# Patient Record
Sex: MALE | Race: WHITE | Employment: OTHER
[De-identification: names, ages, dates, MRNs, and addresses within clinical notes are randomized per-mention and may not be internally consistent; named-entity substitution may affect disease eponyms.]

---

## 2023-06-03 ENCOUNTER — HOSPITAL ENCOUNTER (EMERGENCY)
Facility: HOSPITAL | Age: 73
Discharge: HOME | End: 2023-06-03
Attending: STUDENT IN AN ORGANIZED HEALTH CARE EDUCATION/TRAINING PROGRAM
Payer: MEDICARE

## 2023-06-03 VITALS
HEIGHT: 74 IN | OXYGEN SATURATION: 97 % | SYSTOLIC BLOOD PRESSURE: 134 MMHG | DIASTOLIC BLOOD PRESSURE: 80 MMHG | BODY MASS INDEX: 17.32 KG/M2 | HEART RATE: 64 BPM | TEMPERATURE: 97.5 F | RESPIRATION RATE: 16 BRPM | WEIGHT: 135 LBS

## 2023-06-03 DIAGNOSIS — L02.91 ABSCESS: Primary | ICD-10-CM

## 2023-06-03 PROCEDURE — 2500000003 HC RX 250 WO HCPCS: Performed by: STUDENT IN AN ORGANIZED HEALTH CARE EDUCATION/TRAINING PROGRAM

## 2023-06-03 PROCEDURE — 10060 I&D ABSCESS SIMPLE/SINGLE: CPT

## 2023-06-03 PROCEDURE — 99283 EMERGENCY DEPT VISIT LOW MDM: CPT

## 2023-06-03 PROCEDURE — 6370000000 HC RX 637 (ALT 250 FOR IP): Performed by: STUDENT IN AN ORGANIZED HEALTH CARE EDUCATION/TRAINING PROGRAM

## 2023-06-03 RX ORDER — DOXYCYCLINE HYCLATE 100 MG
100 TABLET ORAL
Status: COMPLETED | OUTPATIENT
Start: 2023-06-03 | End: 2023-06-03

## 2023-06-03 RX ORDER — ESCITALOPRAM OXALATE 10 MG/1
10 TABLET ORAL DAILY
COMMUNITY

## 2023-06-03 RX ORDER — DOXYCYCLINE HYCLATE 100 MG
100 TABLET ORAL 2 TIMES DAILY
Qty: 14 TABLET | Refills: 0 | Status: SHIPPED | OUTPATIENT
Start: 2023-06-03 | End: 2023-06-03 | Stop reason: SDUPTHER

## 2023-06-03 RX ORDER — DOXYCYCLINE HYCLATE 100 MG
100 TABLET ORAL 2 TIMES DAILY
Qty: 14 TABLET | Refills: 0 | Status: SHIPPED | OUTPATIENT
Start: 2023-06-03 | End: 2023-06-10

## 2023-06-03 RX ORDER — MEMANTINE HYDROCHLORIDE 5 MG/1
5 TABLET ORAL 2 TIMES DAILY
COMMUNITY

## 2023-06-03 RX ORDER — LIDOCAINE HYDROCHLORIDE AND EPINEPHRINE 10; 10 MG/ML; UG/ML
10 INJECTION, SOLUTION INFILTRATION; PERINEURAL
Status: COMPLETED | OUTPATIENT
Start: 2023-06-03 | End: 2023-06-03

## 2023-06-03 RX ADMIN — LIDOCAINE HYDROCHLORIDE,EPINEPHRINE BITARTRATE 10 ML: 10; .01 INJECTION, SOLUTION INFILTRATION; PERINEURAL at 18:22

## 2023-06-03 RX ADMIN — DOXYCYCLINE HYCLATE 100 MG: 100 TABLET, COATED ORAL at 19:08

## 2023-06-03 ASSESSMENT — PAIN SCALES - GENERAL: PAINLEVEL_OUTOF10: 0

## 2023-06-03 ASSESSMENT — LIFESTYLE VARIABLES
HOW MANY STANDARD DRINKS CONTAINING ALCOHOL DO YOU HAVE ON A TYPICAL DAY: PATIENT DOES NOT DRINK
HOW OFTEN DO YOU HAVE A DRINK CONTAINING ALCOHOL: NEVER

## 2023-06-03 ASSESSMENT — ENCOUNTER SYMPTOMS: SHORTNESS OF BREATH: 0

## 2023-06-03 NOTE — ED TRIAGE NOTES
Pt ambulated to the treatment area with a steady gait accompanied by his wife. Pt wife states Julio Fleming has cognitive problems so Ill explain, he has had abscesses on his neck in the past he has seen a dermatologist. He has one on hs neck now it looks like it needs to be lanced. He has no fevers. \"

## 2023-06-03 NOTE — ED NOTES
Pt discharged in stable condition at this time. MD and this RN reviewed discharge instructions, prescriptions, and follow up with patient at bedside. Pt verbalized understanding and denies any needs or questions at this time. Patient ambulated out of the ED accompanied by his wife.        Nona Alcaraz RN  06/03/23 0175

## 2023-06-03 NOTE — ED PROVIDER NOTES
[facility] EMERGENCY CTR  EMERGENCY DEPARTMENT ENCOUNTER      Pt Name: Marilynn Acuña  MRN: [id number]  Yang 1950  Date of evaluation: 6/3/2023  Provider: Jens Blair MD    CHIEF COMPLAINT       Chief Complaint   Patient presents with    Skin Problem         HISTORY OF PRESENT ILLNESS   79-year-old male with history of Parkinson's presents to the ED with chief complaint of area of swelling and pain to left posterior neck for the past 2 days. Patient had cyst at that site but it recently became red and painful. He has had abscesses to other cyst in the back of his neck in the past.  No fevers, chills, nausea, vomiting, sore throat, or any other symptoms. No treatment prior to arrival.    The history is provided by the patient and the spouse. Review of External Medical Records:     Nursing Notes were reviewed. REVIEW OF SYSTEMS       Review of Systems   Respiratory:  Negative for shortness of breath. Cardiovascular:  Negative for chest pain. Except as noted above the remainder of the review of systems was reviewed and negative. PAST MEDICAL HISTORY     Past Medical History:   Diagnosis Date    Cancer ([facility] [address]     Parkinson disease ([facility] [address]          SURGICAL HISTORY       Past Surgical History:   Procedure Laterality Date    TURP           CURRENT MEDICATIONS       Current Discharge Medication List        CONTINUE these medications which have NOT CHANGED    Details   CARBIDOPA-LEVODOPA EN by Enteral route in the morning, at noon, in the evening, and at bedtime      memantine (NAMENDA) 5 MG tablet Take 1 tablet by mouth 2 times daily      escitalopram (LEXAPRO) 10 MG tablet Take 1 tablet by mouth daily             ALLERGIES     Patient has no known allergies. FAMILY HISTORY     History reviewed. No pertinent family history.        SOCIAL HISTORY       Social History     Socioeconomic History    Marital status:      Spouse name: None    Number of children: None    Years of education:

## 2023-06-03 NOTE — ED NOTES
Sterile gauze and silk tape applied to drained abscess. Patient tolerated well.       Trevor Hough RN  06/03/23 Olga Cano

## 2023-06-08 ENCOUNTER — HOSPITAL ENCOUNTER (EMERGENCY)
Facility: HOSPITAL | Age: 73
Discharge: HOME | End: 2023-06-08
Attending: EMERGENCY MEDICINE

## 2023-06-08 VITALS
SYSTOLIC BLOOD PRESSURE: 146 MMHG | OXYGEN SATURATION: 97 % | WEIGHT: 129.63 LBS | BODY MASS INDEX: 16.64 KG/M2 | TEMPERATURE: 97.8 F | HEART RATE: 62 BPM | RESPIRATION RATE: 14 BRPM | HEIGHT: 74 IN | DIASTOLIC BLOOD PRESSURE: 87 MMHG

## 2023-06-08 DIAGNOSIS — L02.11 NECK ABSCESS: Primary | ICD-10-CM

## 2023-06-08 PROCEDURE — 4500000002 HC ER NO CHARGE

## 2023-06-08 RX ORDER — RIVASTIGMINE 13.3 MG/24H
PATCH, EXTENDED RELEASE TRANSDERMAL
COMMUNITY

## 2023-06-08 RX ORDER — PANTOPRAZOLE SODIUM 40 MG/1
TABLET, DELAYED RELEASE ORAL
COMMUNITY
Start: 2023-05-04

## 2023-06-08 ASSESSMENT — LIFESTYLE VARIABLES: HOW OFTEN DO YOU HAVE A DRINK CONTAINING ALCOHOL: NEVER

## 2023-06-08 ASSESSMENT — PAIN - FUNCTIONAL ASSESSMENT: PAIN_FUNCTIONAL_ASSESSMENT: NONE - DENIES PAIN

## 2023-06-08 NOTE — ED TRIAGE NOTES
Pt's spouse reports he had a cyst drained on the L side of his neck on 6/3/23 and they feel like it now looks infected and they noticed some pus and had been having some intermittent bleeding since then. Pt denies fever.

## 2023-06-08 NOTE — DISCHARGE INSTRUCTIONS
Return if you develop any new symptoms you find concerning.   In the meantime I would continue the antibiotics prescribed to you and follow-up with your dermatologist when you get back to [facility].

## 2023-06-08 NOTE — ED PROVIDER NOTES
[facility] EMERGENCY CTR  EMERGENCY DEPARTMENT ENCOUNTER      Pt Name: Kellee Chaney  MRN: [id number]  Yang 1950  Date of evaluation: 6/8/2023  Provider: Marie Womack MD      HISTORY OF PRESENT ILLNESS      HPI  80-year-old male with a past medical history of Parkinson's disease and prostate cancer presenting to the emergency department due to concerns for an infected neck cyst.  Patient seen in the emergency department 5 days ago for similar symptoms. I&D was performed and he was placed on doxycycline. Over the last 24 to 36 hours he has had some recurrent purulent drainage and bleeding from the wound. The wound is not bothering him and his wife does not believe it is grown in size. He has had some overlying erythema that the wife thinks is secondary to the trauma from the I&D as this has not changed since the procedure either. No fevers. He has had this neck cyst for many years and follows with a dermatologist in [place] where he and his wife live. They are planning to follow-up with his dermatologist when they return home. Nursing Notes were reviewed.     REVIEW OF SYSTEMS         Review of Systems  A complete review of systems was performed all systems reviewed are negative unless otherwise document in the HPI      PAST MEDICAL HISTORY     Past Medical History:   Diagnosis Date    Cancer ([facility] [address]     Parkinson disease ([facility] [address]          SURGICAL HISTORY       Past Surgical History:   Procedure Laterality Date    TURP           CURRENT MEDICATIONS       Previous Medications    CARBIDOPA-LEVODOPA EN    by Enteral route in the morning, at noon, in the evening, and at bedtime    DOXYCYCLINE HYCLATE (VIBRA-TABS) 100 MG TABLET    Take 1 tablet by mouth 2 times daily for 7 days    ESCITALOPRAM (LEXAPRO) 10 MG TABLET    Take 1 tablet by mouth daily    FINASTERIDE PO    Take 5 mg by mouth    MEMANTINE (NAMENDA) 5 MG TABLET    Take 1 tablet by mouth 2 times daily    PANTOPRAZOLE (PROTONIX) 40 MG TABLET

## 2023-06-17 ENCOUNTER — HOSPITAL ENCOUNTER (EMERGENCY)
Facility: HOSPITAL | Age: 73
Discharge: HOME | End: 2023-06-17
Attending: EMERGENCY MEDICINE
Payer: MEDICARE

## 2023-06-17 VITALS
WEIGHT: 126.54 LBS | SYSTOLIC BLOOD PRESSURE: 117 MMHG | RESPIRATION RATE: 18 BRPM | TEMPERATURE: 98.1 F | BODY MASS INDEX: 16.25 KG/M2 | DIASTOLIC BLOOD PRESSURE: 93 MMHG | OXYGEN SATURATION: 100 % | HEART RATE: 71 BPM

## 2023-06-17 DIAGNOSIS — N30.00 ACUTE CYSTITIS WITHOUT HEMATURIA: Primary | ICD-10-CM

## 2023-06-17 LAB
APPEARANCE UR: ABNORMAL
BACTERIA URNS QL MICRO: NEGATIVE /HPF
BILIRUB UR QL: NEGATIVE
CAOX CRY URNS QL MICRO: ABNORMAL
COLOR UR: ABNORMAL
EPITH CASTS URNS QL MICRO: ABNORMAL /LPF
GLUCOSE UR STRIP.AUTO-MCNC: NEGATIVE MG/DL
HGB UR QL STRIP: ABNORMAL
KETONES UR QL STRIP.AUTO: 15 MG/DL
LEUKOCYTE ESTERASE UR QL STRIP.AUTO: ABNORMAL
NITRITE UR QL STRIP.AUTO: POSITIVE
PH UR STRIP: 5 (ref 5–8)
PROT UR STRIP-MCNC: >300 MG/DL
RBC #/AREA URNS HPF: >100 /HPF (ref 0–5)
SP GR UR REFRACTOMETRY: 1.02 (ref 1–1.03)
URINE CULTURE IF INDICATED: ABNORMAL
UROBILINOGEN UR QL STRIP.AUTO: 2 EU/DL (ref 0.2–1)
WBC URNS QL MICRO: ABNORMAL /HPF (ref 0–4)

## 2023-06-17 PROCEDURE — 99283 EMERGENCY DEPT VISIT LOW MDM: CPT

## 2023-06-17 PROCEDURE — 81001 URINALYSIS AUTO W/SCOPE: CPT

## 2023-06-17 RX ORDER — SULFAMETHOXAZOLE AND TRIMETHOPRIM 800; 160 MG/1; MG/1
1 TABLET ORAL 2 TIMES DAILY
Qty: 20 TABLET | Refills: 0 | Status: SHIPPED | OUTPATIENT
Start: 2023-06-17 | End: 2023-06-27

## 2023-06-17 NOTE — ED PROVIDER NOTES
Assumed care of this 77-year-old male with history of Parkinson's disease presents to the emergency department his wife with concern for frequency without dysuria or hematuria. Prior team and ordered urinalysis which has come back with positive nitrites and RBCs. No significant WBCs or bacteria, however these findings in the context of his suprapubic pain likely represent a urinary tract infection. We will provide a course of antibiotics and discharge at this time with recommendation to follow-up with his primary care provider or return to the emergency department for new or worsening symptoms.      Daria George MD  06/17/23 Yomi Moy
and nursing note reviewed. Constitutional:       Appearance: Normal appearance. Cardiovascular:      Rate and Rhythm: Normal rate. Abdominal:      Palpations: Abdomen is soft. Tenderness: There is no abdominal tenderness. Neurological:      Mental Status: He is alert. EMERGENCY DEPARTMENT COURSE and DIFFERENTIAL DIAGNOSIS/MDM:   Vitals:    Vitals:    06/17/23 1734   BP: (!) 152/95   Pulse: 71   Resp: 18   Temp: 98.1 °F (36.7 °C)   SpO2: 97%   Weight: 57.4 kg (126 lb 8.7 oz)         Medical Decision Making  6:58 PM  Change of shift. Care of patient signed over to Dr Atif To. Handoff complete. Amount and/or Complexity of Data Reviewed  Labs: ordered.             REASSESSMENT          CONSULTS:  None    PROCEDURES:     Procedures            (Please note that portions of this note were completed with a voice recognition program.  Efforts were made to edit the dictations but occasionally words are mis-transcribed.)    Jayashree Kulkarni MD (electronically signed)  Emergency Attending Physician              Jayashree Kulkarni MD  06/17/23 8229

## 2023-06-17 NOTE — DISCHARGE INSTRUCTIONS
You were seen in the emergency department for urinary frequency and pain with urination. The results of your tests were consistent with a UTI. Please take any medications prescribed at this visit as instructed. Please follow-up with your PCP or return to the emergency department if you experience a worsening of symptoms or any new symptoms that are concerning to you.

## 2023-06-17 NOTE — ED TRIAGE NOTES
Pt arrives with increased urinary frequency and urgency onset 1 week along with some fecal incontinence. Pt has had two back to back ABX regimens for abscess on his neck.      Hx: Parkinsons

## 2023-06-17 NOTE — ED NOTES
Patient stable at time of discharge. Reviewed discharge instructions with patient and wife. Both verbalized understanding. Denies questions at this time. Patient ambulatory out of department with spouse.       Lazaro Thompson RN  06/17/23 1945

## 2023-12-25 ENCOUNTER — APPOINTMENT (OUTPATIENT)
Facility: HOSPITAL | Age: 73
End: 2023-12-25
Payer: MEDICARE

## 2023-12-25 ENCOUNTER — HOSPITAL ENCOUNTER (EMERGENCY)
Facility: HOSPITAL | Age: 73
Discharge: HOME | End: 2023-12-25
Attending: EMERGENCY MEDICINE
Payer: MEDICARE

## 2023-12-25 VITALS
TEMPERATURE: 98.1 F | RESPIRATION RATE: 11 BRPM | WEIGHT: 134.04 LBS | HEART RATE: 63 BPM | DIASTOLIC BLOOD PRESSURE: 85 MMHG | BODY MASS INDEX: 17.21 KG/M2 | OXYGEN SATURATION: 100 % | SYSTOLIC BLOOD PRESSURE: 171 MMHG

## 2023-12-25 DIAGNOSIS — R55 NEAR SYNCOPE: Primary | ICD-10-CM

## 2023-12-25 LAB
ALBUMIN SERPL-MCNC: 3 G/DL (ref 3.5–5)
ALBUMIN/GLOB SERPL: 0.8 (ref 1.1–2.2)
ALP SERPL-CCNC: 85 U/L (ref 45–117)
ALT SERPL-CCNC: <6 U/L (ref 12–78)
ANION GAP SERPL CALC-SCNC: 7 MMOL/L (ref 5–15)
APPEARANCE UR: CLEAR
AST SERPL-CCNC: 29 U/L (ref 15–37)
BACTERIA URNS QL MICRO: NEGATIVE /HPF
BASOPHILS # BLD: 0 K/UL (ref 0–0.1)
BASOPHILS NFR BLD: 1 % (ref 0–1)
BILIRUB SERPL-MCNC: 1.3 MG/DL (ref 0.2–1)
BILIRUB UR QL: NEGATIVE
BUN SERPL-MCNC: 21 MG/DL (ref 6–20)
BUN/CREAT SERPL: 20 (ref 12–20)
CALCIUM SERPL-MCNC: 8.7 MG/DL (ref 8.5–10.1)
CAOX CRY URNS QL MICRO: ABNORMAL
CHLORIDE SERPL-SCNC: 101 MMOL/L (ref 97–108)
CO2 SERPL-SCNC: 27 MMOL/L (ref 21–32)
COLOR UR: ABNORMAL
COMMENT:: NORMAL
CREAT SERPL-MCNC: 1.03 MG/DL (ref 0.7–1.3)
DIFFERENTIAL METHOD BLD: ABNORMAL
EKG ATRIAL RATE: 55 BPM
EKG DIAGNOSIS: NORMAL
EKG P AXIS: 78 DEGREES
EKG P-R INTERVAL: 154 MS
EKG Q-T INTERVAL: 446 MS
EKG QRS DURATION: 102 MS
EKG QTC CALCULATION (BAZETT): 426 MS
EKG R AXIS: 76 DEGREES
EKG T AXIS: 55 DEGREES
EKG VENTRICULAR RATE: 55 BPM
EOSINOPHIL # BLD: 0.1 K/UL (ref 0–0.4)
EOSINOPHIL NFR BLD: 1 % (ref 0–7)
EPITH CASTS URNS QL MICRO: ABNORMAL /LPF
ERYTHROCYTE [DISTWIDTH] IN BLOOD BY AUTOMATED COUNT: 14.8 % (ref 11.5–14.5)
GLOBULIN SER CALC-MCNC: 3.8 G/DL (ref 2–4)
GLUCOSE SERPL-MCNC: 122 MG/DL (ref 65–100)
GLUCOSE UR STRIP.AUTO-MCNC: NEGATIVE MG/DL
HCT VFR BLD AUTO: 42.5 % (ref 36.6–50.3)
HGB BLD-MCNC: 14.1 G/DL (ref 12.1–17)
HGB UR QL STRIP: NEGATIVE
IMM GRANULOCYTES # BLD AUTO: 0 K/UL (ref 0–0.04)
IMM GRANULOCYTES NFR BLD AUTO: 0 % (ref 0–0.5)
KETONES UR QL STRIP.AUTO: NEGATIVE MG/DL
LACTATE SERPL-SCNC: 1.9 MMOL/L (ref 0.4–2)
LEUKOCYTE ESTERASE UR QL STRIP.AUTO: ABNORMAL
LYMPHOCYTES # BLD: 1.5 K/UL (ref 0.8–3.5)
LYMPHOCYTES NFR BLD: 23 % (ref 12–49)
MCH RBC QN AUTO: 32 PG (ref 26–34)
MCHC RBC AUTO-ENTMCNC: 33.2 G/DL (ref 30–36.5)
MCV RBC AUTO: 96.6 FL (ref 80–99)
MONOCYTES # BLD: 0.6 K/UL (ref 0–1)
MONOCYTES NFR BLD: 10 % (ref 5–13)
NEUTS SEG # BLD: 4.2 K/UL (ref 1.8–8)
NEUTS SEG NFR BLD: 65 % (ref 32–75)
NITRITE UR QL STRIP.AUTO: NEGATIVE
NRBC # BLD: 0 K/UL (ref 0–0.01)
NRBC BLD-RTO: 0 PER 100 WBC
PH UR STRIP: 6.5 (ref 5–8)
PLATELET # BLD AUTO: 258 K/UL (ref 150–400)
PMV BLD AUTO: 10.8 FL (ref 8.9–12.9)
POTASSIUM SERPL-SCNC: 4.5 MMOL/L (ref 3.5–5.1)
PROT SERPL-MCNC: 6.8 G/DL (ref 6.4–8.2)
PROT UR STRIP-MCNC: NEGATIVE MG/DL
RBC # BLD AUTO: 4.4 M/UL (ref 4.1–5.7)
RBC #/AREA URNS HPF: ABNORMAL /HPF (ref 0–5)
SODIUM SERPL-SCNC: 135 MMOL/L (ref 136–145)
SP GR UR REFRACTOMETRY: 1.01 (ref 1–1.03)
SPECIMEN HOLD: NORMAL
SPECIMEN HOLD: NORMAL
TROPONIN I SERPL HS-MCNC: 22 NG/L (ref 0–76)
TROPONIN I SERPL HS-MCNC: 23 NG/L (ref 0–76)
UROBILINOGEN UR QL STRIP.AUTO: 0.2 EU/DL (ref 0.2–1)
WBC # BLD AUTO: 6.4 K/UL (ref 4.1–11.1)
WBC URNS QL MICRO: ABNORMAL /HPF (ref 0–4)

## 2023-12-25 PROCEDURE — 96374 THER/PROPH/DIAG INJ IV PUSH: CPT

## 2023-12-25 PROCEDURE — 93005 ELECTROCARDIOGRAM TRACING: CPT | Performed by: EMERGENCY MEDICINE

## 2023-12-25 PROCEDURE — 2580000003 HC RX 258: Performed by: STUDENT IN AN ORGANIZED HEALTH CARE EDUCATION/TRAINING PROGRAM

## 2023-12-25 PROCEDURE — 70450 CT HEAD/BRAIN W/O DYE: CPT

## 2023-12-25 PROCEDURE — 6360000002 HC RX W HCPCS: Performed by: STUDENT IN AN ORGANIZED HEALTH CARE EDUCATION/TRAINING PROGRAM

## 2023-12-25 PROCEDURE — 83605 ASSAY OF LACTIC ACID: CPT

## 2023-12-25 PROCEDURE — 71046 X-RAY EXAM CHEST 2 VIEWS: CPT

## 2023-12-25 PROCEDURE — 81001 URINALYSIS AUTO W/SCOPE: CPT

## 2023-12-25 PROCEDURE — 99285 EMERGENCY DEPT VISIT HI MDM: CPT

## 2023-12-25 PROCEDURE — 96361 HYDRATE IV INFUSION ADD-ON: CPT

## 2023-12-25 PROCEDURE — 93010 ELECTROCARDIOGRAM REPORT: CPT | Performed by: INTERNAL MEDICINE

## 2023-12-25 PROCEDURE — 72125 CT NECK SPINE W/O DYE: CPT

## 2023-12-25 PROCEDURE — 36415 COLL VENOUS BLD VENIPUNCTURE: CPT

## 2023-12-25 PROCEDURE — 84484 ASSAY OF TROPONIN QUANT: CPT

## 2023-12-25 PROCEDURE — 85025 COMPLETE CBC W/AUTO DIFF WBC: CPT

## 2023-12-25 PROCEDURE — 80053 COMPREHEN METABOLIC PANEL: CPT

## 2023-12-25 RX ORDER — 0.9 % SODIUM CHLORIDE 0.9 %
1000 INTRAVENOUS SOLUTION INTRAVENOUS ONCE
Status: COMPLETED | OUTPATIENT
Start: 2023-12-25 | End: 2023-12-25

## 2023-12-25 RX ORDER — ONDANSETRON 2 MG/ML
4 INJECTION INTRAMUSCULAR; INTRAVENOUS ONCE
Status: COMPLETED | OUTPATIENT
Start: 2023-12-25 | End: 2023-12-25

## 2023-12-25 RX ADMIN — SODIUM CHLORIDE 1000 ML: 9 INJECTION, SOLUTION INTRAVENOUS at 12:48

## 2023-12-25 RX ADMIN — ONDANSETRON 4 MG: 2 INJECTION INTRAMUSCULAR; INTRAVENOUS at 12:47

## 2023-12-25 NOTE — ED NOTES
The patient left the Emergency Department ambulatory, alert and and in no acute distress. The patient's wife was encouraged to call or return to the ED for worsening issues or problems and was encouraged to schedule a follow up appointment for continuing care. The patient's wife verbalized understanding of discharge instructions and all questions were answered. The patient's wife has no further concerns at this time.

## 2023-12-25 NOTE — ED PROVIDER NOTES
[facility]  EMERGENCY DEPARTMENT ENCOUNTER      Pt Name: Bello Walsh  MRN: [id number]  [address] [facility] [place] 1950  Date of evaluation: 12/25/2023  Provider: Nasir Pierre       Chief Complaint   Patient presents with    Loss of Consciousness         HISTORY OF PRESENT ILLNESS   (Location/Symptom, Timing/Onset, Context/Setting, Quality, Duration, Modifying Factors, Severity)  Note limiting factors. 77-year-old male presents to ED with syncopal episode. Patient arrives via EMS with reports he had a syncopal episode at home. Per patient wife patient was sitting at the kitchen counter and he had just finished his breakfast when all of a sudden he seemed to slump over and was not responding. She reports she tried to lift his head up and his eyes rolled back in his head. He then had an episode of emesis and then appeared to be more alert. She denies any history of similar symptoms. Patient has a history of dementia and Parkinson's. Patient does not remember the episode which patient's wife reports is at baseline for him but he now denies any chest pain, headache, vision changes, dizziness, shortness of breath, nausea, vomiting, diarrhea, dysuria urinary frequency. Patient's wife notes that he has a history of frequent urinary tract infections that he sees urologist for and also has a history of aspiration pneumonia and follows with a pulmonologist.  They recently relocated to this area from [facility]. and are working on reestablishing with PCP and specialists. He is not on any blood thinners. Review of External Medical Records:     Nursing Notes were reviewed. REVIEW OF SYSTEMS    (2-9 systems for level 4, 10 or more for level 5)     Review of Systems   Constitutional:  Negative for chills and fever. HENT:  Negative for congestion, ear pain and sore throat. Eyes:  Negative for pain. Respiratory:  Negative for cough and shortness of breath.     Cardiovascular:

## 2023-12-25 NOTE — ED TRIAGE NOTES
Patient arrives with cc of syncopal episode that took place this morning and witnessed by wife. Per EMS patient passed out, came to and vomiting 1 x time. Per EMS wife is otw. Patient denies pain at this time. Hx of parkinson's, dementia, and L sided weakness. Patient is A & O x 1.

## 2023-12-25 NOTE — DISCHARGE INSTRUCTIONS
Continue to stay hydrated and eat bland, easy to digest foods such as bananas, rice, applesauce and toast. Follow up with your PCP and return with any changes or worsening of symptoms.

## 2024-01-01 ENCOUNTER — APPOINTMENT (OUTPATIENT)
Facility: HOSPITAL | Age: 74
DRG: 871 | End: 2024-01-01
Payer: MEDICARE

## 2024-01-01 ENCOUNTER — HOSPICE ADMISSION (OUTPATIENT)
Age: 74
End: 2024-01-01
Payer: MEDICARE

## 2024-01-01 ENCOUNTER — HOSPITAL ENCOUNTER (INPATIENT)
Facility: HOSPITAL | Age: 74
LOS: 7 days | Discharge: HOSPICE | DRG: 871 | End: 2024-06-30
Attending: EMERGENCY MEDICINE | Admitting: FAMILY MEDICINE
Payer: MEDICARE

## 2024-01-01 VITALS
TEMPERATURE: 98.6 F | RESPIRATION RATE: 23 BRPM | SYSTOLIC BLOOD PRESSURE: 126 MMHG | BODY MASS INDEX: 16.4 KG/M2 | HEART RATE: 98 BPM | HEIGHT: 74 IN | OXYGEN SATURATION: 91 % | DIASTOLIC BLOOD PRESSURE: 86 MMHG | WEIGHT: 127.8 LBS

## 2024-01-01 DIAGNOSIS — S31.000A WOUND OF SACRAL REGION, INITIAL ENCOUNTER: ICD-10-CM

## 2024-01-01 DIAGNOSIS — J96.01 ACUTE RESPIRATORY FAILURE WITH HYPOXIA (HCC): ICD-10-CM

## 2024-01-01 DIAGNOSIS — J18.9 PNEUMONIA OF BOTH LOWER LOBES DUE TO INFECTIOUS ORGANISM: ICD-10-CM

## 2024-01-01 DIAGNOSIS — N17.9 AKI (ACUTE KIDNEY INJURY) (HCC): ICD-10-CM

## 2024-01-01 DIAGNOSIS — R65.20 SEPSIS WITH ACUTE ORGAN DYSFUNCTION WITHOUT SEPTIC SHOCK, DUE TO UNSPECIFIED ORGANISM, UNSPECIFIED ORGAN DYSFUNCTION TYPE (HCC): Primary | ICD-10-CM

## 2024-01-01 DIAGNOSIS — A41.9 SEPSIS WITH ACUTE ORGAN DYSFUNCTION WITHOUT SEPTIC SHOCK, DUE TO UNSPECIFIED ORGANISM, UNSPECIFIED ORGAN DYSFUNCTION TYPE (HCC): Primary | ICD-10-CM

## 2024-01-01 LAB
ACCESSION NUMBER, LLC1M: ABNORMAL
ACINETOBACTER CALCOAC BAUMANNII COMPLEX BY PCR: NOT DETECTED
ALBUMIN SERPL-MCNC: 3 G/DL (ref 3.5–5)
ALBUMIN/GLOB SERPL: 0.7 (ref 1.1–2.2)
ALP SERPL-CCNC: 152 U/L (ref 45–117)
ALT SERPL-CCNC: 12 U/L (ref 12–78)
ANION GAP SERPL CALC-SCNC: 2 MMOL/L (ref 5–15)
ANION GAP SERPL CALC-SCNC: 3 MMOL/L (ref 5–15)
ANION GAP SERPL CALC-SCNC: 8 MMOL/L (ref 5–15)
ANION GAP SERPL CALC-SCNC: 9 MMOL/L (ref 5–15)
ANION GAP SERPL CALC-SCNC: ABNORMAL MMOL/L (ref 5–15)
APPEARANCE UR: ABNORMAL
AST SERPL-CCNC: 12 U/L (ref 15–37)
B FRAGILIS DNA BLD POS QL NAA+NON-PROBE: NOT DETECTED
BACTERIA SPEC CULT: ABNORMAL
BACTERIA SPEC CULT: ABNORMAL
BACTERIA SPEC CULT: NORMAL
BACTERIA URNS QL MICRO: ABNORMAL /HPF
BASOPHILS # BLD: 0 K/UL (ref 0–0.1)
BASOPHILS NFR BLD: 0 % (ref 0–1)
BILIRUB SERPL-MCNC: 1.2 MG/DL (ref 0.2–1)
BILIRUB UR QL: NEGATIVE
BIOFIRE TEST COMMENT: ABNORMAL
BLACTX-M ISLT/SPM QL: DETECTED
BLAIMP ISLT/SPM QL: NOT DETECTED
BLAKPC ISLT/SPM QL: NOT DETECTED
BLAOXA-48-LIKE ISLT/SPM QL: NOT DETECTED
BLAVIM ISLT/SPM QL: NOT DETECTED
BUN SERPL-MCNC: 29 MG/DL (ref 6–20)
BUN SERPL-MCNC: 32 MG/DL (ref 6–20)
BUN SERPL-MCNC: 40 MG/DL (ref 6–20)
BUN SERPL-MCNC: 44 MG/DL (ref 6–20)
BUN SERPL-MCNC: 47 MG/DL (ref 6–20)
BUN/CREAT SERPL: 29 (ref 12–20)
BUN/CREAT SERPL: 48 (ref 12–20)
BUN/CREAT SERPL: 49 (ref 12–20)
BUN/CREAT SERPL: 50 (ref 12–20)
BUN/CREAT SERPL: 52 (ref 12–20)
C ALBICANS DNA BLD POS QL NAA+NON-PROBE: NOT DETECTED
C AURIS DNA BLD POS QL NAA+NON-PROBE: NOT DETECTED
C GATTII+NEOFOR DNA BLD POS QL NAA+N-PRB: NOT DETECTED
C GLABRATA DNA BLD POS QL NAA+NON-PROBE: NOT DETECTED
C KRUSEI DNA BLD POS QL NAA+NON-PROBE: NOT DETECTED
C PARAP DNA BLD POS QL NAA+NON-PROBE: NOT DETECTED
C TROPICLS DNA BLD POS QL NAA+NON-PROBE: NOT DETECTED
CALCIUM SERPL-MCNC: 8.5 MG/DL (ref 8.5–10.1)
CALCIUM SERPL-MCNC: 8.6 MG/DL (ref 8.5–10.1)
CALCIUM SERPL-MCNC: 9.4 MG/DL (ref 8.5–10.1)
CAOX CRY URNS QL MICRO: ABNORMAL
CHLORIDE SERPL-SCNC: 113 MMOL/L (ref 97–108)
CHLORIDE SERPL-SCNC: 123 MMOL/L (ref 97–108)
CHLORIDE SERPL-SCNC: 124 MMOL/L (ref 97–108)
CHLORIDE SERPL-SCNC: 124 MMOL/L (ref 97–108)
CHLORIDE SERPL-SCNC: 127 MMOL/L (ref 97–108)
CO2 SERPL-SCNC: 22 MMOL/L (ref 21–32)
CO2 SERPL-SCNC: 25 MMOL/L (ref 21–32)
CO2 SERPL-SCNC: 26 MMOL/L (ref 21–32)
CO2 SERPL-SCNC: 28 MMOL/L (ref 21–32)
CO2 SERPL-SCNC: 29 MMOL/L (ref 21–32)
COLISTIN RES MCR-1 ISLT/SPM QL: NOT DETECTED
COLOR UR: ABNORMAL
COMMENT:: NORMAL
CREAT SERPL-MCNC: 0.56 MG/DL (ref 0.7–1.3)
CREAT SERPL-MCNC: 0.66 MG/DL (ref 0.7–1.3)
CREAT SERPL-MCNC: 0.82 MG/DL (ref 0.7–1.3)
CREAT SERPL-MCNC: 0.88 MG/DL (ref 0.7–1.3)
CREAT SERPL-MCNC: 1.63 MG/DL (ref 0.7–1.3)
DIFFERENTIAL METHOD BLD: ABNORMAL
E CLOAC COMP DNA BLD POS NAA+NON-PROBE: NOT DETECTED
E COLI DNA BLD POS QL NAA+NON-PROBE: DETECTED
E FAECALIS DNA BLD POS QL NAA+NON-PROBE: NOT DETECTED
E FAECIUM DNA BLD POS QL NAA+NON-PROBE: NOT DETECTED
EKG ATRIAL RATE: 121 BPM
EKG DIAGNOSIS: NORMAL
EKG P AXIS: 85 DEGREES
EKG P-R INTERVAL: 128 MS
EKG Q-T INTERVAL: 358 MS
EKG QRS DURATION: 96 MS
EKG QTC CALCULATION (BAZETT): 508 MS
EKG R AXIS: 89 DEGREES
EKG T AXIS: 71 DEGREES
EKG VENTRICULAR RATE: 121 BPM
ENTEROBACTERALES DNA BLD POS NAA+N-PRB: DETECTED
EOSINOPHIL # BLD: 0 K/UL (ref 0–0.4)
EOSINOPHIL # BLD: 0.1 K/UL (ref 0–0.4)
EOSINOPHIL NFR BLD: 0 % (ref 0–7)
EOSINOPHIL NFR BLD: 1 % (ref 0–7)
EPITH CASTS URNS QL MICRO: ABNORMAL /LPF
ERYTHROCYTE [DISTWIDTH] IN BLOOD BY AUTOMATED COUNT: 14.8 % (ref 11.5–14.5)
ERYTHROCYTE [DISTWIDTH] IN BLOOD BY AUTOMATED COUNT: 15 % (ref 11.5–14.5)
ERYTHROCYTE [DISTWIDTH] IN BLOOD BY AUTOMATED COUNT: 15.1 % (ref 11.5–14.5)
ERYTHROCYTE [DISTWIDTH] IN BLOOD BY AUTOMATED COUNT: 15.2 % (ref 11.5–14.5)
ERYTHROCYTE [DISTWIDTH] IN BLOOD BY AUTOMATED COUNT: 15.3 % (ref 11.5–14.5)
EST. AVERAGE GLUCOSE BLD GHB EST-MCNC: 85 MG/DL
FLUAV RNA SPEC QL NAA+PROBE: NOT DETECTED
FLUBV RNA SPEC QL NAA+PROBE: NOT DETECTED
GLOBULIN SER CALC-MCNC: 4.3 G/DL (ref 2–4)
GLUCOSE BLD STRIP.AUTO-MCNC: 102 MG/DL (ref 65–117)
GLUCOSE BLD STRIP.AUTO-MCNC: 103 MG/DL (ref 65–117)
GLUCOSE BLD STRIP.AUTO-MCNC: 104 MG/DL (ref 65–117)
GLUCOSE BLD STRIP.AUTO-MCNC: 112 MG/DL (ref 65–117)
GLUCOSE BLD STRIP.AUTO-MCNC: 114 MG/DL (ref 65–117)
GLUCOSE BLD STRIP.AUTO-MCNC: 117 MG/DL (ref 65–117)
GLUCOSE BLD STRIP.AUTO-MCNC: 117 MG/DL (ref 65–117)
GLUCOSE BLD STRIP.AUTO-MCNC: 119 MG/DL (ref 65–117)
GLUCOSE BLD STRIP.AUTO-MCNC: 120 MG/DL (ref 65–117)
GLUCOSE BLD STRIP.AUTO-MCNC: 122 MG/DL (ref 65–117)
GLUCOSE BLD STRIP.AUTO-MCNC: 136 MG/DL (ref 65–117)
GLUCOSE BLD STRIP.AUTO-MCNC: 137 MG/DL (ref 65–117)
GLUCOSE BLD STRIP.AUTO-MCNC: 138 MG/DL (ref 65–117)
GLUCOSE BLD STRIP.AUTO-MCNC: 142 MG/DL (ref 65–117)
GLUCOSE BLD STRIP.AUTO-MCNC: 146 MG/DL (ref 65–117)
GLUCOSE BLD STRIP.AUTO-MCNC: 166 MG/DL (ref 65–117)
GLUCOSE BLD STRIP.AUTO-MCNC: 194 MG/DL (ref 65–117)
GLUCOSE BLD STRIP.AUTO-MCNC: 76 MG/DL (ref 65–117)
GLUCOSE BLD STRIP.AUTO-MCNC: 85 MG/DL (ref 65–117)
GLUCOSE BLD STRIP.AUTO-MCNC: 88 MG/DL (ref 65–117)
GLUCOSE BLD STRIP.AUTO-MCNC: 89 MG/DL (ref 65–117)
GLUCOSE BLD STRIP.AUTO-MCNC: 90 MG/DL (ref 65–117)
GLUCOSE BLD STRIP.AUTO-MCNC: 94 MG/DL (ref 65–117)
GLUCOSE BLD STRIP.AUTO-MCNC: 95 MG/DL (ref 65–117)
GLUCOSE BLD STRIP.AUTO-MCNC: 95 MG/DL (ref 65–117)
GLUCOSE BLD STRIP.AUTO-MCNC: 96 MG/DL (ref 65–117)
GLUCOSE SERPL-MCNC: 107 MG/DL (ref 65–100)
GLUCOSE SERPL-MCNC: 107 MG/DL (ref 65–100)
GLUCOSE SERPL-MCNC: 111 MG/DL (ref 65–100)
GLUCOSE SERPL-MCNC: 113 MG/DL (ref 65–100)
GLUCOSE SERPL-MCNC: 213 MG/DL (ref 65–100)
GLUCOSE UR STRIP.AUTO-MCNC: NEGATIVE MG/DL
GP B STREP DNA BLD POS QL NAA+NON-PROBE: NOT DETECTED
HAEM INFLU DNA BLD POS QL NAA+NON-PROBE: NOT DETECTED
HBA1C MFR BLD: 4.6 % (ref 4–5.6)
HCT VFR BLD AUTO: 38.3 % (ref 36.6–50.3)
HCT VFR BLD AUTO: 38.8 % (ref 36.6–50.3)
HCT VFR BLD AUTO: 40.2 % (ref 36.6–50.3)
HCT VFR BLD AUTO: 41.1 % (ref 36.6–50.3)
HCT VFR BLD AUTO: 45.3 % (ref 36.6–50.3)
HGB BLD-MCNC: 12 G/DL (ref 12.1–17)
HGB BLD-MCNC: 12.2 G/DL (ref 12.1–17)
HGB BLD-MCNC: 12.9 G/DL (ref 12.1–17)
HGB BLD-MCNC: 12.9 G/DL (ref 12.1–17)
HGB BLD-MCNC: 14.5 G/DL (ref 12.1–17)
HGB UR QL STRIP: ABNORMAL
IMM GRANULOCYTES # BLD AUTO: 0 K/UL
IMM GRANULOCYTES # BLD AUTO: 0.1 K/UL (ref 0–0.04)
IMM GRANULOCYTES NFR BLD AUTO: 0 %
IMM GRANULOCYTES NFR BLD AUTO: 1 % (ref 0–0.5)
K OXYTOCA DNA BLD POS QL NAA+NON-PROBE: NOT DETECTED
KETONES UR QL STRIP.AUTO: ABNORMAL MG/DL
KLEBSIELLA SP DNA BLD POS QL NAA+NON-PRB: NOT DETECTED
KLEBSIELLA SP DNA BLD POS QL NAA+NON-PRB: NOT DETECTED
L MONOCYTOG DNA BLD POS QL NAA+NON-PROBE: NOT DETECTED
LACTATE SERPL-SCNC: 1.3 MMOL/L (ref 0.4–2)
LACTATE SERPL-SCNC: 2.6 MMOL/L (ref 0.4–2)
LACTATE SERPL-SCNC: 6.8 MMOL/L (ref 0.4–2)
LEUKOCYTE ESTERASE UR QL STRIP.AUTO: ABNORMAL
LYMPHOCYTES # BLD: 0.7 K/UL (ref 0.8–3.5)
LYMPHOCYTES # BLD: 0.8 K/UL (ref 0.8–3.5)
LYMPHOCYTES # BLD: 0.9 K/UL (ref 0.8–3.5)
LYMPHOCYTES # BLD: 1.1 K/UL (ref 0.8–3.5)
LYMPHOCYTES # BLD: 1.4 K/UL (ref 0.8–3.5)
LYMPHOCYTES NFR BLD: 6 % (ref 12–49)
LYMPHOCYTES NFR BLD: 6 % (ref 12–49)
LYMPHOCYTES NFR BLD: 7 % (ref 12–49)
LYMPHOCYTES NFR BLD: 7 % (ref 12–49)
LYMPHOCYTES NFR BLD: 8 % (ref 12–49)
MAGNESIUM SERPL-MCNC: 2.3 MG/DL (ref 1.6–2.4)
MAGNESIUM SERPL-MCNC: 2.4 MG/DL (ref 1.6–2.4)
MCH RBC QN AUTO: 32.3 PG (ref 26–34)
MCH RBC QN AUTO: 32.4 PG (ref 26–34)
MCH RBC QN AUTO: 32.7 PG (ref 26–34)
MCH RBC QN AUTO: 32.8 PG (ref 26–34)
MCH RBC QN AUTO: 33.5 PG (ref 26–34)
MCHC RBC AUTO-ENTMCNC: 31.3 G/DL (ref 30–36.5)
MCHC RBC AUTO-ENTMCNC: 31.4 G/DL (ref 30–36.5)
MCHC RBC AUTO-ENTMCNC: 31.4 G/DL (ref 30–36.5)
MCHC RBC AUTO-ENTMCNC: 32 G/DL (ref 30–36.5)
MCHC RBC AUTO-ENTMCNC: 32.1 G/DL (ref 30–36.5)
MCV RBC AUTO: 102.5 FL (ref 80–99)
MCV RBC AUTO: 102.9 FL (ref 80–99)
MCV RBC AUTO: 103.2 FL (ref 80–99)
MCV RBC AUTO: 104.3 FL (ref 80–99)
MCV RBC AUTO: 104.4 FL (ref 80–99)
MONOCYTES # BLD: 0.4 K/UL (ref 0–1)
MONOCYTES # BLD: 0.7 K/UL (ref 0–1)
MONOCYTES # BLD: 0.9 K/UL (ref 0–1)
MONOCYTES # BLD: 1.1 K/UL (ref 0–1)
MONOCYTES # BLD: 1.2 K/UL (ref 0–1)
MONOCYTES NFR BLD: 3 % (ref 5–13)
MONOCYTES NFR BLD: 6 % (ref 5–13)
MONOCYTES NFR BLD: 6 % (ref 5–13)
MONOCYTES NFR BLD: 7 % (ref 5–13)
MONOCYTES NFR BLD: 8 % (ref 5–13)
N MEN DNA BLD POS QL NAA+NON-PROBE: NOT DETECTED
NEUTS BAND NFR BLD MANUAL: 2 % (ref 0–6)
NEUTS BAND NFR BLD MANUAL: 3 % (ref 0–6)
NEUTS BAND NFR BLD MANUAL: 4 % (ref 0–6)
NEUTS BAND NFR BLD MANUAL: 5 % (ref 0–6)
NEUTS SEG # BLD: 11 K/UL (ref 1.8–8)
NEUTS SEG # BLD: 11.4 K/UL (ref 1.8–8)
NEUTS SEG # BLD: 11.7 K/UL (ref 1.8–8)
NEUTS SEG # BLD: 12.9 K/UL (ref 1.8–8)
NEUTS SEG # BLD: 15.5 K/UL (ref 1.8–8)
NEUTS SEG NFR BLD: 81 % (ref 32–75)
NEUTS SEG NFR BLD: 83 % (ref 32–75)
NEUTS SEG NFR BLD: 84 % (ref 32–75)
NEUTS SEG NFR BLD: 85 % (ref 32–75)
NEUTS SEG NFR BLD: 87 % (ref 32–75)
NITRITE UR QL STRIP.AUTO: POSITIVE
NRBC # BLD: 0 K/UL (ref 0–0.01)
NRBC BLD-RTO: 0 PER 100 WBC
P AERUGINOSA DNA BLD POS NAA+NON-PROBE: NOT DETECTED
PH UR STRIP: 5 (ref 5–8)
PLATELET # BLD AUTO: 218 K/UL (ref 150–400)
PLATELET # BLD AUTO: 220 K/UL (ref 150–400)
PLATELET # BLD AUTO: 223 K/UL (ref 150–400)
PLATELET # BLD AUTO: 240 K/UL (ref 150–400)
PLATELET # BLD AUTO: 269 K/UL (ref 150–400)
PMV BLD AUTO: 10.6 FL (ref 8.9–12.9)
PMV BLD AUTO: 10.8 FL (ref 8.9–12.9)
PMV BLD AUTO: 11.1 FL (ref 8.9–12.9)
PMV BLD AUTO: 11.2 FL (ref 8.9–12.9)
PMV BLD AUTO: 11.6 FL (ref 8.9–12.9)
POTASSIUM SERPL-SCNC: 3.3 MMOL/L (ref 3.5–5.1)
POTASSIUM SERPL-SCNC: 3.4 MMOL/L (ref 3.5–5.1)
POTASSIUM SERPL-SCNC: 3.5 MMOL/L (ref 3.5–5.1)
POTASSIUM SERPL-SCNC: 3.9 MMOL/L (ref 3.5–5.1)
POTASSIUM SERPL-SCNC: 4.2 MMOL/L (ref 3.5–5.1)
PROCALCITONIN SERPL-MCNC: 1.39 NG/ML
PROCALCITONIN SERPL-MCNC: 1.63 NG/ML
PROT SERPL-MCNC: 7.3 G/DL (ref 6.4–8.2)
PROT UR STRIP-MCNC: 100 MG/DL
PROTEUS SP DNA BLD POS QL NAA+NON-PROBE: NOT DETECTED
RBC # BLD AUTO: 3.71 M/UL (ref 4.1–5.7)
RBC # BLD AUTO: 3.77 M/UL (ref 4.1–5.7)
RBC # BLD AUTO: 3.85 M/UL (ref 4.1–5.7)
RBC # BLD AUTO: 3.94 M/UL (ref 4.1–5.7)
RBC # BLD AUTO: 4.42 M/UL (ref 4.1–5.7)
RBC #/AREA URNS HPF: ABNORMAL /HPF (ref 0–5)
RBC MORPH BLD: ABNORMAL
RESISTANT GENE NDM BY PCR: NOT DETECTED
RESISTANT GENE TARGETS: ABNORMAL
S AUREUS DNA BLD POS QL NAA+NON-PROBE: NOT DETECTED
S AUREUS+CONS DNA BLD POS NAA+NON-PROBE: NOT DETECTED
S EPIDERMIDIS DNA BLD POS QL NAA+NON-PRB: NOT DETECTED
S LUGDUNENSIS DNA BLD POS QL NAA+NON-PRB: NOT DETECTED
S MALTOPHILIA DNA BLD POS QL NAA+NON-PRB: NOT DETECTED
S MARCESCENS DNA BLD POS NAA+NON-PROBE: NOT DETECTED
S PNEUM DNA BLD POS QL NAA+NON-PROBE: NOT DETECTED
S PYO DNA BLD POS QL NAA+NON-PROBE: NOT DETECTED
SALMONELLA DNA BLD POS QL NAA+NON-PROBE: NOT DETECTED
SARS-COV-2 RNA RESP QL NAA+PROBE: NOT DETECTED
SERVICE CMNT-IMP: ABNORMAL
SERVICE CMNT-IMP: NORMAL
SODIUM SERPL-SCNC: 143 MMOL/L (ref 136–145)
SODIUM SERPL-SCNC: 147 MMOL/L (ref 136–145)
SODIUM SERPL-SCNC: 148 MMOL/L (ref 136–145)
SODIUM SERPL-SCNC: 152 MMOL/L (ref 136–145)
SODIUM SERPL-SCNC: 153 MMOL/L (ref 136–145)
SODIUM SERPL-SCNC: 155 MMOL/L (ref 136–145)
SP GR UR REFRACTOMETRY: 1.03 (ref 1–1.03)
SPECIMEN HOLD: NORMAL
STREPTOCOCCUS DNA BLD POS NAA+NON-PROBE: NOT DETECTED
TROPONIN I SERPL HS-MCNC: 25 NG/L (ref 0–76)
URINE CULTURE IF INDICATED: ABNORMAL
UROBILINOGEN UR QL STRIP.AUTO: 0.2 EU/DL (ref 0.2–1)
VANCOMYCIN SERPL-MCNC: 11.9 UG/ML
WBC # BLD AUTO: 12.4 K/UL (ref 4.1–11.1)
WBC # BLD AUTO: 12.9 K/UL (ref 4.1–11.1)
WBC # BLD AUTO: 13.2 K/UL (ref 4.1–11.1)
WBC # BLD AUTO: 15.4 K/UL (ref 4.1–11.1)
WBC # BLD AUTO: 18 K/UL (ref 4.1–11.1)
WBC URNS QL MICRO: ABNORMAL /HPF (ref 0–4)

## 2024-01-01 PROCEDURE — 0656 HSPC GENERAL INPATIENT

## 2024-01-01 PROCEDURE — 99285 EMERGENCY DEPT VISIT HI MDM: CPT

## 2024-01-01 PROCEDURE — 6370000000 HC RX 637 (ALT 250 FOR IP): Performed by: NURSE PRACTITIONER

## 2024-01-01 PROCEDURE — 97530 THERAPEUTIC ACTIVITIES: CPT

## 2024-01-01 PROCEDURE — 36415 COLL VENOUS BLD VENIPUNCTURE: CPT

## 2024-01-01 PROCEDURE — 96374 THER/PROPH/DIAG INJ IV PUSH: CPT

## 2024-01-01 PROCEDURE — 2580000003 HC RX 258: Performed by: STUDENT IN AN ORGANIZED HEALTH CARE EDUCATION/TRAINING PROGRAM

## 2024-01-01 PROCEDURE — 97161 PT EVAL LOW COMPLEX 20 MIN: CPT

## 2024-01-01 PROCEDURE — 6360000002 HC RX W HCPCS: Performed by: STUDENT IN AN ORGANIZED HEALTH CARE EDUCATION/TRAINING PROGRAM

## 2024-01-01 PROCEDURE — 2060000000 HC ICU INTERMEDIATE R&B

## 2024-01-01 PROCEDURE — 82962 GLUCOSE BLOOD TEST: CPT

## 2024-01-01 PROCEDURE — 6370000000 HC RX 637 (ALT 250 FOR IP): Performed by: FAMILY MEDICINE

## 2024-01-01 PROCEDURE — 2580000003 HC RX 258: Performed by: FAMILY MEDICINE

## 2024-01-01 PROCEDURE — 87186 SC STD MICRODIL/AGAR DIL: CPT

## 2024-01-01 PROCEDURE — 99223 1ST HOSP IP/OBS HIGH 75: CPT | Performed by: PHYSICAL MEDICINE & REHABILITATION

## 2024-01-01 PROCEDURE — 96361 HYDRATE IV INFUSION ADD-ON: CPT

## 2024-01-01 PROCEDURE — 6370000000 HC RX 637 (ALT 250 FOR IP): Performed by: STUDENT IN AN ORGANIZED HEALTH CARE EDUCATION/TRAINING PROGRAM

## 2024-01-01 PROCEDURE — 99232 SBSQ HOSP IP/OBS MODERATE 35: CPT | Performed by: INTERNAL MEDICINE

## 2024-01-01 PROCEDURE — 83735 ASSAY OF MAGNESIUM: CPT

## 2024-01-01 PROCEDURE — 6360000002 HC RX W HCPCS: Performed by: FAMILY MEDICINE

## 2024-01-01 PROCEDURE — 87154 CUL TYP ID BLD PTHGN 6+ TRGT: CPT

## 2024-01-01 PROCEDURE — 71045 X-RAY EXAM CHEST 1 VIEW: CPT

## 2024-01-01 PROCEDURE — 80048 BASIC METABOLIC PNL TOTAL CA: CPT

## 2024-01-01 PROCEDURE — 97530 THERAPEUTIC ACTIVITIES: CPT | Performed by: PHYSICAL THERAPIST

## 2024-01-01 PROCEDURE — 6360000002 HC RX W HCPCS: Performed by: HOSPITALIST

## 2024-01-01 PROCEDURE — 6360000002 HC RX W HCPCS: Performed by: EMERGENCY MEDICINE

## 2024-01-01 PROCEDURE — 87040 BLOOD CULTURE FOR BACTERIA: CPT

## 2024-01-01 PROCEDURE — 2580000003 HC RX 258: Performed by: NURSE PRACTITIONER

## 2024-01-01 PROCEDURE — 84145 PROCALCITONIN (PCT): CPT

## 2024-01-01 PROCEDURE — 87086 URINE CULTURE/COLONY COUNT: CPT

## 2024-01-01 PROCEDURE — 74018 RADEX ABDOMEN 1 VIEW: CPT

## 2024-01-01 PROCEDURE — 83605 ASSAY OF LACTIC ACID: CPT

## 2024-01-01 PROCEDURE — G0316 PR PROLONG INPT EVAL ADD15 M: HCPCS | Performed by: PHYSICAL MEDICINE & REHABILITATION

## 2024-01-01 PROCEDURE — 84484 ASSAY OF TROPONIN QUANT: CPT

## 2024-01-01 PROCEDURE — 84295 ASSAY OF SERUM SODIUM: CPT

## 2024-01-01 PROCEDURE — 85025 COMPLETE CBC W/AUTO DIFF WBC: CPT

## 2024-01-01 PROCEDURE — 83036 HEMOGLOBIN GLYCOSYLATED A1C: CPT

## 2024-01-01 PROCEDURE — 80053 COMPREHEN METABOLIC PANEL: CPT

## 2024-01-01 PROCEDURE — 6360000002 HC RX W HCPCS: Performed by: NURSE PRACTITIONER

## 2024-01-01 PROCEDURE — C9113 INJ PANTOPRAZOLE SODIUM, VIA: HCPCS | Performed by: FAMILY MEDICINE

## 2024-01-01 PROCEDURE — 6370000000 HC RX 637 (ALT 250 FOR IP): Performed by: HOSPITALIST

## 2024-01-01 PROCEDURE — 92610 EVALUATE SWALLOWING FUNCTION: CPT

## 2024-01-01 PROCEDURE — 99232 SBSQ HOSP IP/OBS MODERATE 35: CPT | Performed by: PHYSICAL MEDICINE & REHABILITATION

## 2024-01-01 PROCEDURE — 51798 US URINE CAPACITY MEASURE: CPT

## 2024-01-01 PROCEDURE — 97165 OT EVAL LOW COMPLEX 30 MIN: CPT

## 2024-01-01 PROCEDURE — 93005 ELECTROCARDIOGRAM TRACING: CPT | Performed by: EMERGENCY MEDICINE

## 2024-01-01 PROCEDURE — 97535 SELF CARE MNGMENT TRAINING: CPT

## 2024-01-01 PROCEDURE — 87636 SARSCOV2 & INF A&B AMP PRB: CPT

## 2024-01-01 PROCEDURE — 99222 1ST HOSP IP/OBS MODERATE 55: CPT | Performed by: INTERNAL MEDICINE

## 2024-01-01 PROCEDURE — 2580000003 HC RX 258: Performed by: EMERGENCY MEDICINE

## 2024-01-01 PROCEDURE — 87077 CULTURE AEROBIC IDENTIFY: CPT

## 2024-01-01 PROCEDURE — 80202 ASSAY OF VANCOMYCIN: CPT

## 2024-01-01 PROCEDURE — 81001 URINALYSIS AUTO W/SCOPE: CPT

## 2024-01-01 PROCEDURE — 99221 1ST HOSP IP/OBS SF/LOW 40: CPT | Performed by: NURSE PRACTITIONER

## 2024-01-01 RX ORDER — POLYETHYLENE GLYCOL 3350 17 G/17G
17 POWDER, FOR SOLUTION ORAL 2 TIMES DAILY
Status: DISCONTINUED | OUTPATIENT
Start: 2024-01-01 | End: 2024-01-01

## 2024-01-01 RX ORDER — SODIUM CHLORIDE 0.9 % (FLUSH) 0.9 %
5-40 SYRINGE (ML) INJECTION PRN
Status: DISCONTINUED | OUTPATIENT
Start: 2024-01-01 | End: 2024-01-01

## 2024-01-01 RX ORDER — CERAMIDE 1,3,6-II/SALICYLIC/B3
1 CLEANSER (ML) TOPICAL DAILY
COMMUNITY

## 2024-01-01 RX ORDER — ENOXAPARIN SODIUM 100 MG/ML
40 INJECTION SUBCUTANEOUS DAILY
Status: DISCONTINUED | OUTPATIENT
Start: 2024-01-01 | End: 2024-01-01

## 2024-01-01 RX ORDER — SODIUM CHLORIDE 0.9 % (FLUSH) 0.9 %
5-40 SYRINGE (ML) INJECTION EVERY 12 HOURS SCHEDULED
Status: DISCONTINUED | OUTPATIENT
Start: 2024-01-01 | End: 2024-01-01

## 2024-01-01 RX ORDER — QUETIAPINE FUMARATE 25 MG/1
12.5 TABLET, FILM COATED ORAL EVERY EVENING
COMMUNITY

## 2024-01-01 RX ORDER — 0.9 % SODIUM CHLORIDE 0.9 %
563 INTRAVENOUS SOLUTION INTRAVENOUS ONCE
Status: COMPLETED | OUTPATIENT
Start: 2024-01-01 | End: 2024-01-01

## 2024-01-01 RX ORDER — IPRATROPIUM BROMIDE AND ALBUTEROL SULFATE 2.5; .5 MG/3ML; MG/3ML
1 SOLUTION RESPIRATORY (INHALATION) EVERY 4 HOURS PRN
Status: DISCONTINUED | OUTPATIENT
Start: 2024-01-01 | End: 2024-01-01 | Stop reason: HOSPADM

## 2024-01-01 RX ORDER — SODIUM CHLORIDE 9 MG/ML
INJECTION, SOLUTION INTRAVENOUS CONTINUOUS
Status: DISCONTINUED | OUTPATIENT
Start: 2024-01-01 | End: 2024-01-01

## 2024-01-01 RX ORDER — POTASSIUM CHLORIDE 750 MG/1
40 TABLET, FILM COATED, EXTENDED RELEASE ORAL PRN
Status: DISCONTINUED | OUTPATIENT
Start: 2024-01-01 | End: 2024-01-01 | Stop reason: HOSPADM

## 2024-01-01 RX ORDER — RIVASTIGMINE 13.3 MG/24H
1 PATCH, EXTENDED RELEASE TRANSDERMAL DAILY
Status: DISCONTINUED | OUTPATIENT
Start: 2024-01-01 | End: 2024-01-01 | Stop reason: SDUPTHER

## 2024-01-01 RX ORDER — MORPHINE SULFATE 4 MG/ML
4 INJECTION, SOLUTION INTRAMUSCULAR; INTRAVENOUS
Status: DISCONTINUED | OUTPATIENT
Start: 2024-01-01 | End: 2024-01-01 | Stop reason: HOSPADM

## 2024-01-01 RX ORDER — ESCITALOPRAM OXALATE 10 MG/1
20 TABLET ORAL DAILY
Status: DISCONTINUED | OUTPATIENT
Start: 2024-01-01 | End: 2024-01-01

## 2024-01-01 RX ORDER — KETOCONAZOLE 20 MG/ML
1 SHAMPOO TOPICAL DAILY PRN
COMMUNITY

## 2024-01-01 RX ORDER — SODIUM CHLORIDE 9 MG/ML
INJECTION, SOLUTION INTRAVENOUS PRN
Status: DISCONTINUED | OUTPATIENT
Start: 2024-01-01 | End: 2024-01-01 | Stop reason: HOSPADM

## 2024-01-01 RX ORDER — SODIUM CHLORIDE 0.9 % (FLUSH) 0.9 %
5-40 SYRINGE (ML) INJECTION PRN
Status: DISCONTINUED | OUTPATIENT
Start: 2024-01-01 | End: 2024-01-01 | Stop reason: HOSPADM

## 2024-01-01 RX ORDER — ONDANSETRON 4 MG/1
4 TABLET, ORALLY DISINTEGRATING ORAL EVERY 8 HOURS PRN
Status: DISCONTINUED | OUTPATIENT
Start: 2024-01-01 | End: 2024-01-01 | Stop reason: HOSPADM

## 2024-01-01 RX ORDER — DEXTROSE MONOHYDRATE AND SODIUM CHLORIDE 5; .45 G/100ML; G/100ML
INJECTION, SOLUTION INTRAVENOUS CONTINUOUS
Status: DISCONTINUED | OUTPATIENT
Start: 2024-01-01 | End: 2024-01-01

## 2024-01-01 RX ORDER — MEMANTINE HYDROCHLORIDE 5 MG/1
10 TABLET ORAL 2 TIMES DAILY
Status: DISCONTINUED | OUTPATIENT
Start: 2024-01-01 | End: 2024-01-01

## 2024-01-01 RX ORDER — ACETAMINOPHEN 325 MG/1
650 TABLET ORAL 3 TIMES DAILY
Status: DISCONTINUED | OUTPATIENT
Start: 2024-01-01 | End: 2024-01-01

## 2024-01-01 RX ORDER — MORPHINE SULFATE 2 MG/ML
2 INJECTION, SOLUTION INTRAMUSCULAR; INTRAVENOUS
Status: DISCONTINUED | OUTPATIENT
Start: 2024-01-01 | End: 2024-01-01 | Stop reason: HOSPADM

## 2024-01-01 RX ORDER — DEXTROSE MONOHYDRATE 50 MG/ML
INJECTION, SOLUTION INTRAVENOUS CONTINUOUS
Status: DISCONTINUED | OUTPATIENT
Start: 2024-01-01 | End: 2024-01-01 | Stop reason: HOSPADM

## 2024-01-01 RX ORDER — MAGNESIUM SULFATE IN WATER 40 MG/ML
2000 INJECTION, SOLUTION INTRAVENOUS PRN
Status: DISCONTINUED | OUTPATIENT
Start: 2024-01-01 | End: 2024-01-01 | Stop reason: HOSPADM

## 2024-01-01 RX ORDER — ACETAMINOPHEN 650 MG/1
650 SUPPOSITORY RECTAL EVERY 6 HOURS PRN
Status: DISCONTINUED | OUTPATIENT
Start: 2024-01-01 | End: 2024-01-01 | Stop reason: HOSPADM

## 2024-01-01 RX ORDER — ONDANSETRON 2 MG/ML
4 INJECTION INTRAMUSCULAR; INTRAVENOUS EVERY 6 HOURS PRN
Status: DISCONTINUED | OUTPATIENT
Start: 2024-01-01 | End: 2024-01-01

## 2024-01-01 RX ORDER — DEXTROSE MONOHYDRATE 100 MG/ML
INJECTION, SOLUTION INTRAVENOUS CONTINUOUS PRN
Status: DISCONTINUED | OUTPATIENT
Start: 2024-01-01 | End: 2024-01-01 | Stop reason: HOSPADM

## 2024-01-01 RX ORDER — CASTOR OIL AND BALSAM, PERU 788; 87 MG/G; MG/G
OINTMENT TOPICAL 2 TIMES DAILY
Status: DISCONTINUED | OUTPATIENT
Start: 2024-01-01 | End: 2024-01-01

## 2024-01-01 RX ORDER — INSULIN LISPRO 100 [IU]/ML
0-4 INJECTION, SOLUTION INTRAVENOUS; SUBCUTANEOUS
Status: DISCONTINUED | OUTPATIENT
Start: 2024-01-01 | End: 2024-01-01

## 2024-01-01 RX ORDER — RIVASTIGMINE 13.3 MG/24H
1 PATCH, EXTENDED RELEASE TRANSDERMAL DAILY
Status: DISCONTINUED | OUTPATIENT
Start: 2024-01-01 | End: 2024-01-01 | Stop reason: HOSPADM

## 2024-01-01 RX ORDER — PANTOPRAZOLE SODIUM 40 MG/1
40 TABLET, DELAYED RELEASE ORAL
Status: DISCONTINUED | OUTPATIENT
Start: 2024-01-01 | End: 2024-01-01

## 2024-01-01 RX ORDER — ASPIRIN 81 MG/1
81 TABLET, CHEWABLE ORAL DAILY
Status: DISCONTINUED | OUTPATIENT
Start: 2024-01-01 | End: 2024-01-01

## 2024-01-01 RX ORDER — FINASTERIDE 5 MG/1
5 TABLET, FILM COATED ORAL
Status: DISCONTINUED | OUTPATIENT
Start: 2024-01-01 | End: 2024-01-01

## 2024-01-01 RX ORDER — POTASSIUM CHLORIDE 7.45 MG/ML
10 INJECTION INTRAVENOUS PRN
Status: DISCONTINUED | OUTPATIENT
Start: 2024-01-01 | End: 2024-01-01 | Stop reason: HOSPADM

## 2024-01-01 RX ORDER — ACETAMINOPHEN 325 MG/1
650 TABLET ORAL EVERY 6 HOURS PRN
Status: DISCONTINUED | OUTPATIENT
Start: 2024-01-01 | End: 2024-01-01 | Stop reason: HOSPADM

## 2024-01-01 RX ORDER — QUETIAPINE FUMARATE 25 MG/1
12.5 TABLET, FILM COATED ORAL EVERY 24 HOURS
Status: DISCONTINUED | OUTPATIENT
Start: 2024-01-01 | End: 2024-01-01

## 2024-01-01 RX ORDER — 0.9 % SODIUM CHLORIDE 0.9 %
1000 INTRAVENOUS SOLUTION INTRAVENOUS ONCE
Status: COMPLETED | OUTPATIENT
Start: 2024-01-01 | End: 2024-01-01

## 2024-01-01 RX ORDER — ONDANSETRON 2 MG/ML
4 INJECTION INTRAMUSCULAR; INTRAVENOUS EVERY 6 HOURS PRN
Status: DISCONTINUED | OUTPATIENT
Start: 2024-01-01 | End: 2024-01-01 | Stop reason: HOSPADM

## 2024-01-01 RX ORDER — LORAZEPAM 2 MG/ML
1 INJECTION INTRAMUSCULAR EVERY 6 HOURS PRN
Status: DISCONTINUED | OUTPATIENT
Start: 2024-01-01 | End: 2024-01-01 | Stop reason: HOSPADM

## 2024-01-01 RX ORDER — POLYETHYLENE GLYCOL 3350 17 G/17G
17 POWDER, FOR SOLUTION ORAL DAILY PRN
Status: DISCONTINUED | OUTPATIENT
Start: 2024-01-01 | End: 2024-01-01

## 2024-01-01 RX ORDER — ASPIRIN 81 MG/1
81 TABLET, CHEWABLE ORAL DAILY
COMMUNITY

## 2024-01-01 RX ORDER — INSULIN LISPRO 100 [IU]/ML
0-4 INJECTION, SOLUTION INTRAVENOUS; SUBCUTANEOUS NIGHTLY
Status: DISCONTINUED | OUTPATIENT
Start: 2024-01-01 | End: 2024-01-01

## 2024-01-01 RX ADMIN — SODIUM CHLORIDE: 9 INJECTION, SOLUTION INTRAVENOUS at 03:45

## 2024-01-01 RX ADMIN — MEROPENEM 1000 MG: 1 INJECTION, POWDER, FOR SOLUTION INTRAVENOUS at 19:06

## 2024-01-01 RX ADMIN — ACETAMINOPHEN 650 MG: 325 TABLET ORAL at 14:30

## 2024-01-01 RX ADMIN — CARBIDOPA AND LEVODOPA 1.5 TABLET: 25; 100 TABLET ORAL at 09:53

## 2024-01-01 RX ADMIN — ACETAMINOPHEN 650 MG: 325 TABLET ORAL at 21:39

## 2024-01-01 RX ADMIN — PANTOPRAZOLE SODIUM 40 MG: 40 TABLET, DELAYED RELEASE ORAL at 07:48

## 2024-01-01 RX ADMIN — SODIUM CHLORIDE, PRESERVATIVE FREE 10 ML: 5 INJECTION INTRAVENOUS at 21:39

## 2024-01-01 RX ADMIN — COLLAGENASE SANTYL: 250 OINTMENT TOPICAL at 08:53

## 2024-01-01 RX ADMIN — POTASSIUM BICARBONATE 40 MEQ: 782 TABLET, EFFERVESCENT ORAL at 09:01

## 2024-01-01 RX ADMIN — LORAZEPAM 1 MG: 2 INJECTION, SOLUTION INTRAMUSCULAR; INTRAVENOUS at 14:51

## 2024-01-01 RX ADMIN — POTASSIUM BICARBONATE 40 MEQ: 782 TABLET, EFFERVESCENT ORAL at 05:29

## 2024-01-01 RX ADMIN — SODIUM CHLORIDE 40 MG: 9 INJECTION INTRAMUSCULAR; INTRAVENOUS; SUBCUTANEOUS at 09:25

## 2024-01-01 RX ADMIN — DEXTROSE AND SODIUM CHLORIDE: 5; 450 INJECTION, SOLUTION INTRAVENOUS at 21:14

## 2024-01-01 RX ADMIN — FINASTERIDE 5 MG: 5 TABLET, FILM COATED ORAL at 21:12

## 2024-01-01 RX ADMIN — CARBIDOPA AND LEVODOPA 1.5 TABLET: 25; 100 TABLET ORAL at 11:39

## 2024-01-01 RX ADMIN — SODIUM CHLORIDE 563 ML: 9 INJECTION, SOLUTION INTRAVENOUS at 13:20

## 2024-01-01 RX ADMIN — MEROPENEM 1000 MG: 1 INJECTION, POWDER, FOR SOLUTION INTRAVENOUS at 12:56

## 2024-01-01 RX ADMIN — CARBIDOPA AND LEVODOPA 1.5 TABLET: 25; 100 TABLET ORAL at 09:02

## 2024-01-01 RX ADMIN — Medication: at 08:19

## 2024-01-01 RX ADMIN — ACETAMINOPHEN 650 MG: 650 SUPPOSITORY RECTAL at 14:06

## 2024-01-01 RX ADMIN — MEMANTINE 10 MG: 5 TABLET ORAL at 08:52

## 2024-01-01 RX ADMIN — ASPIRIN 81 MG CHEWABLE TABLET 81 MG: 81 TABLET CHEWABLE at 09:53

## 2024-01-01 RX ADMIN — CARBIDOPA AND LEVODOPA 1.5 TABLET: 25; 100 TABLET ORAL at 21:37

## 2024-01-01 RX ADMIN — MORPHINE SULFATE 4 MG: 4 INJECTION, SOLUTION INTRAMUSCULAR; INTRAVENOUS at 14:51

## 2024-01-01 RX ADMIN — SODIUM CHLORIDE 40 MG: 9 INJECTION INTRAMUSCULAR; INTRAVENOUS; SUBCUTANEOUS at 19:12

## 2024-01-01 RX ADMIN — COLLAGENASE SANTYL: 250 OINTMENT TOPICAL at 21:13

## 2024-01-01 RX ADMIN — CARBIDOPA AND LEVODOPA 1.5 TABLET: 25; 100 TABLET ORAL at 09:24

## 2024-01-01 RX ADMIN — CARBIDOPA AND LEVODOPA 1.5 TABLET: 25; 100 TABLET ORAL at 12:01

## 2024-01-01 RX ADMIN — CARBIDOPA AND LEVODOPA 1.5 TABLET: 25; 100 TABLET ORAL at 21:07

## 2024-01-01 RX ADMIN — CARBIDOPA AND LEVODOPA 1.5 TABLET: 25; 100 TABLET ORAL at 13:00

## 2024-01-01 RX ADMIN — MEMANTINE 10 MG: 5 TABLET ORAL at 21:37

## 2024-01-01 RX ADMIN — ACETAMINOPHEN 650 MG: 325 TABLET ORAL at 09:01

## 2024-01-01 RX ADMIN — CARBIDOPA AND LEVODOPA 1.5 TABLET: 25; 100 TABLET ORAL at 17:51

## 2024-01-01 RX ADMIN — MEMANTINE 10 MG: 5 TABLET ORAL at 10:00

## 2024-01-01 RX ADMIN — CARBIDOPA AND LEVODOPA 1.5 TABLET: 25; 100 TABLET ORAL at 21:24

## 2024-01-01 RX ADMIN — MEROPENEM 1000 MG: 1 INJECTION, POWDER, FOR SOLUTION INTRAVENOUS at 17:56

## 2024-01-01 RX ADMIN — FINASTERIDE 5 MG: 5 TABLET, FILM COATED ORAL at 21:06

## 2024-01-01 RX ADMIN — SODIUM CHLORIDE, PRESERVATIVE FREE 10 ML: 5 INJECTION INTRAVENOUS at 21:13

## 2024-01-01 RX ADMIN — ESCITALOPRAM OXALATE 20 MG: 10 TABLET ORAL at 10:00

## 2024-01-01 RX ADMIN — ASPIRIN 81 MG CHEWABLE TABLET 81 MG: 81 TABLET CHEWABLE at 08:52

## 2024-01-01 RX ADMIN — QUETIAPINE FUMARATE 12.5 MG: 25 TABLET ORAL at 18:31

## 2024-01-01 RX ADMIN — MEROPENEM 1000 MG: 1 INJECTION, POWDER, FOR SOLUTION INTRAVENOUS at 20:14

## 2024-01-01 RX ADMIN — MEROPENEM 1000 MG: 1 INJECTION, POWDER, FOR SOLUTION INTRAVENOUS at 03:46

## 2024-01-01 RX ADMIN — POLYETHYLENE GLYCOL 3350 17 G: 17 POWDER, FOR SOLUTION ORAL at 12:15

## 2024-01-01 RX ADMIN — CARBIDOPA AND LEVODOPA 1.5 TABLET: 25; 100 TABLET ORAL at 18:31

## 2024-01-01 RX ADMIN — ESCITALOPRAM OXALATE 20 MG: 10 TABLET ORAL at 09:00

## 2024-01-01 RX ADMIN — Medication: at 21:07

## 2024-01-01 RX ADMIN — CARBIDOPA AND LEVODOPA 1.5 TABLET: 25; 100 TABLET ORAL at 21:38

## 2024-01-01 RX ADMIN — PANTOPRAZOLE SODIUM 40 MG: 40 TABLET, DELAYED RELEASE ORAL at 05:55

## 2024-01-01 RX ADMIN — DEXTROSE MONOHYDRATE: 50 INJECTION, SOLUTION INTRAVENOUS at 20:19

## 2024-01-01 RX ADMIN — MEMANTINE 10 MG: 5 TABLET ORAL at 22:53

## 2024-01-01 RX ADMIN — ESCITALOPRAM OXALATE 20 MG: 10 TABLET ORAL at 08:26

## 2024-01-01 RX ADMIN — CARBIDOPA AND LEVODOPA 1.5 TABLET: 25; 100 TABLET ORAL at 12:29

## 2024-01-01 RX ADMIN — CARBIDOPA AND LEVODOPA 1.5 TABLET: 25; 100 TABLET ORAL at 15:43

## 2024-01-01 RX ADMIN — SODIUM CHLORIDE 1000 MG: 9 INJECTION INTRAMUSCULAR; INTRAVENOUS; SUBCUTANEOUS at 02:58

## 2024-01-01 RX ADMIN — POLYETHYLENE GLYCOL 3350 17 G: 17 POWDER, FOR SOLUTION ORAL at 22:53

## 2024-01-01 RX ADMIN — DEXTROSE MONOHYDRATE: 50 INJECTION, SOLUTION INTRAVENOUS at 06:34

## 2024-01-01 RX ADMIN — MEROPENEM 1000 MG: 1 INJECTION, POWDER, FOR SOLUTION INTRAVENOUS at 10:05

## 2024-01-01 RX ADMIN — Medication: at 22:30

## 2024-01-01 RX ADMIN — Medication: at 09:00

## 2024-01-01 RX ADMIN — MEROPENEM 1000 MG: 1 INJECTION, POWDER, FOR SOLUTION INTRAVENOUS at 03:01

## 2024-01-01 RX ADMIN — SODIUM CHLORIDE, PRESERVATIVE FREE 10 ML: 5 INJECTION INTRAVENOUS at 21:42

## 2024-01-01 RX ADMIN — ACETAMINOPHEN 650 MG: 325 TABLET ORAL at 15:10

## 2024-01-01 RX ADMIN — SODIUM CHLORIDE 1250 MG: 9 INJECTION, SOLUTION INTRAVENOUS at 14:45

## 2024-01-01 RX ADMIN — ESCITALOPRAM OXALATE 20 MG: 10 TABLET ORAL at 09:53

## 2024-01-01 RX ADMIN — MEROPENEM 1000 MG: 1 INJECTION, POWDER, FOR SOLUTION INTRAVENOUS at 11:15

## 2024-01-01 RX ADMIN — FINASTERIDE 5 MG: 5 TABLET, FILM COATED ORAL at 22:52

## 2024-01-01 RX ADMIN — QUETIAPINE FUMARATE 12.5 MG: 25 TABLET ORAL at 17:51

## 2024-01-01 RX ADMIN — DEXTROSE MONOHYDRATE: 50 INJECTION, SOLUTION INTRAVENOUS at 23:15

## 2024-01-01 RX ADMIN — SODIUM CHLORIDE, PRESERVATIVE FREE 10 ML: 5 INJECTION INTRAVENOUS at 09:03

## 2024-01-01 RX ADMIN — Medication: at 12:01

## 2024-01-01 RX ADMIN — MEROPENEM 1000 MG: 1 INJECTION, POWDER, FOR SOLUTION INTRAVENOUS at 11:17

## 2024-01-01 RX ADMIN — Medication: at 21:14

## 2024-01-01 RX ADMIN — MEROPENEM 1000 MG: 1 INJECTION, POWDER, FOR SOLUTION INTRAVENOUS at 11:41

## 2024-01-01 RX ADMIN — COLLAGENASE SANTYL: 250 OINTMENT TOPICAL at 09:55

## 2024-01-01 RX ADMIN — ESCITALOPRAM OXALATE 20 MG: 10 TABLET ORAL at 09:01

## 2024-01-01 RX ADMIN — ONDANSETRON 4 MG: 2 INJECTION INTRAMUSCULAR; INTRAVENOUS at 08:09

## 2024-01-01 RX ADMIN — DEXTROSE MONOHYDRATE: 50 INJECTION, SOLUTION INTRAVENOUS at 08:03

## 2024-01-01 RX ADMIN — ACETAMINOPHEN 650 MG: 325 TABLET ORAL at 21:07

## 2024-01-01 RX ADMIN — FINASTERIDE 5 MG: 5 TABLET, FILM COATED ORAL at 21:37

## 2024-01-01 RX ADMIN — MEROPENEM 1000 MG: 1 INJECTION, POWDER, FOR SOLUTION INTRAVENOUS at 18:32

## 2024-01-01 RX ADMIN — DEXTROSE AND SODIUM CHLORIDE: 5; 450 INJECTION, SOLUTION INTRAVENOUS at 07:35

## 2024-01-01 RX ADMIN — MEROPENEM 1000 MG: 1 INJECTION, POWDER, FOR SOLUTION INTRAVENOUS at 02:07

## 2024-01-01 RX ADMIN — DEXTROSE MONOHYDRATE: 50 INJECTION, SOLUTION INTRAVENOUS at 05:24

## 2024-01-01 RX ADMIN — MEMANTINE 10 MG: 5 TABLET ORAL at 21:06

## 2024-01-01 RX ADMIN — MEMANTINE 10 MG: 5 TABLET ORAL at 09:02

## 2024-01-01 RX ADMIN — CARBIDOPA AND LEVODOPA 1.5 TABLET: 25; 100 TABLET ORAL at 08:25

## 2024-01-01 RX ADMIN — COLLAGENASE SANTYL: 250 OINTMENT TOPICAL at 12:01

## 2024-01-01 RX ADMIN — FINASTERIDE 5 MG: 5 TABLET, FILM COATED ORAL at 21:24

## 2024-01-01 RX ADMIN — SODIUM CHLORIDE 1000 ML: 9 INJECTION, SOLUTION INTRAVENOUS at 12:13

## 2024-01-01 RX ADMIN — MEROPENEM 1000 MG: 1 INJECTION, POWDER, FOR SOLUTION INTRAVENOUS at 18:54

## 2024-01-01 RX ADMIN — ACETAMINOPHEN 650 MG: 325 TABLET ORAL at 21:12

## 2024-01-01 RX ADMIN — ENOXAPARIN SODIUM 40 MG: 100 INJECTION SUBCUTANEOUS at 09:54

## 2024-01-01 RX ADMIN — Medication: at 10:02

## 2024-01-01 RX ADMIN — ACETAMINOPHEN 650 MG: 325 TABLET ORAL at 15:43

## 2024-01-01 RX ADMIN — MEROPENEM 1000 MG: 1 INJECTION, POWDER, FOR SOLUTION INTRAVENOUS at 18:44

## 2024-01-01 RX ADMIN — FINASTERIDE 5 MG: 5 TABLET, FILM COATED ORAL at 21:39

## 2024-01-01 RX ADMIN — ACETAMINOPHEN 650 MG: 325 TABLET ORAL at 16:30

## 2024-01-01 RX ADMIN — PANTOPRAZOLE SODIUM 40 MG: 40 TABLET, DELAYED RELEASE ORAL at 06:45

## 2024-01-01 RX ADMIN — SODIUM CHLORIDE: 9 INJECTION, SOLUTION INTRAVENOUS at 14:32

## 2024-01-01 RX ADMIN — MEMANTINE 10 MG: 5 TABLET ORAL at 09:54

## 2024-01-01 RX ADMIN — ACETAMINOPHEN 650 MG: 325 TABLET ORAL at 08:25

## 2024-01-01 RX ADMIN — CARBIDOPA AND LEVODOPA 1.5 TABLET: 25; 100 TABLET ORAL at 17:33

## 2024-01-01 RX ADMIN — QUETIAPINE FUMARATE 12.5 MG: 25 TABLET ORAL at 17:19

## 2024-01-01 RX ADMIN — SODIUM CHLORIDE, PRESERVATIVE FREE 10 ML: 5 INJECTION INTRAVENOUS at 21:55

## 2024-01-01 RX ADMIN — MEMANTINE 10 MG: 5 TABLET ORAL at 21:34

## 2024-01-01 RX ADMIN — ENOXAPARIN SODIUM 40 MG: 100 INJECTION SUBCUTANEOUS at 14:05

## 2024-01-01 RX ADMIN — DEXTROSE MONOHYDRATE: 50 INJECTION, SOLUTION INTRAVENOUS at 10:11

## 2024-01-01 RX ADMIN — Medication: at 22:31

## 2024-01-01 RX ADMIN — ACETAMINOPHEN 650 MG: 325 TABLET ORAL at 18:30

## 2024-01-01 RX ADMIN — SODIUM CHLORIDE, PRESERVATIVE FREE 10 ML: 5 INJECTION INTRAVENOUS at 10:03

## 2024-01-01 RX ADMIN — QUETIAPINE FUMARATE 12.5 MG: 25 TABLET ORAL at 17:33

## 2024-01-01 RX ADMIN — CARBIDOPA AND LEVODOPA 1.5 TABLET: 25; 100 TABLET ORAL at 17:22

## 2024-01-01 RX ADMIN — SODIUM CHLORIDE, PRESERVATIVE FREE 10 ML: 5 INJECTION INTRAVENOUS at 08:27

## 2024-01-01 RX ADMIN — CARBIDOPA AND LEVODOPA 1.5 TABLET: 25; 100 TABLET ORAL at 08:52

## 2024-01-01 RX ADMIN — ASPIRIN 81 MG CHEWABLE TABLET 81 MG: 81 TABLET CHEWABLE at 08:25

## 2024-01-01 RX ADMIN — ENOXAPARIN SODIUM 40 MG: 100 INJECTION SUBCUTANEOUS at 08:30

## 2024-01-01 RX ADMIN — MEROPENEM 1000 MG: 1 INJECTION, POWDER, FOR SOLUTION INTRAVENOUS at 11:06

## 2024-01-01 RX ADMIN — SODIUM CHLORIDE: 9 INJECTION, SOLUTION INTRAVENOUS at 05:00

## 2024-01-01 RX ADMIN — SODIUM CHLORIDE, PRESERVATIVE FREE 10 ML: 5 INJECTION INTRAVENOUS at 09:25

## 2024-01-01 RX ADMIN — MEMANTINE 10 MG: 5 TABLET ORAL at 08:26

## 2024-01-01 RX ADMIN — MEMANTINE 10 MG: 5 TABLET ORAL at 21:12

## 2024-01-01 RX ADMIN — PANTOPRAZOLE SODIUM 40 MG: 40 TABLET, DELAYED RELEASE ORAL at 07:45

## 2024-01-01 RX ADMIN — COLLAGENASE SANTYL: 250 OINTMENT TOPICAL at 10:02

## 2024-01-01 RX ADMIN — PANTOPRAZOLE SODIUM 40 MG: 40 TABLET, DELAYED RELEASE ORAL at 06:34

## 2024-01-01 RX ADMIN — WATER 1000 MG: 1 INJECTION INTRAMUSCULAR; INTRAVENOUS; SUBCUTANEOUS at 13:20

## 2024-01-01 RX ADMIN — CARBIDOPA AND LEVODOPA 1.5 TABLET: 25; 100 TABLET ORAL at 22:52

## 2024-01-01 RX ADMIN — ENOXAPARIN SODIUM 40 MG: 100 INJECTION SUBCUTANEOUS at 09:25

## 2024-01-01 RX ADMIN — DEXTROSE AND SODIUM CHLORIDE: 5; 450 INJECTION, SOLUTION INTRAVENOUS at 08:59

## 2024-01-01 RX ADMIN — MEROPENEM 1000 MG: 1 INJECTION, POWDER, FOR SOLUTION INTRAVENOUS at 01:49

## 2024-01-01 RX ADMIN — SODIUM CHLORIDE: 9 INJECTION, SOLUTION INTRAVENOUS at 20:13

## 2024-01-01 RX ADMIN — ASPIRIN 81 MG CHEWABLE TABLET 81 MG: 81 TABLET CHEWABLE at 10:00

## 2024-01-01 RX ADMIN — Medication: at 08:53

## 2024-01-01 RX ADMIN — ONDANSETRON 4 MG: 2 INJECTION INTRAMUSCULAR; INTRAVENOUS at 19:42

## 2024-01-01 RX ADMIN — ACETAMINOPHEN 650 MG: 325 TABLET ORAL at 09:59

## 2024-01-01 RX ADMIN — ASPIRIN 81 MG CHEWABLE TABLET 81 MG: 81 TABLET CHEWABLE at 09:01

## 2024-01-01 RX ADMIN — Medication: at 09:54

## 2024-01-01 RX ADMIN — CARBIDOPA AND LEVODOPA 1.5 TABLET: 25; 100 TABLET ORAL at 12:14

## 2024-01-01 RX ADMIN — ENOXAPARIN SODIUM 40 MG: 100 INJECTION SUBCUTANEOUS at 09:01

## 2024-01-01 RX ADMIN — Medication: at 21:40

## 2024-01-01 RX ADMIN — ACETAMINOPHEN 650 MG: 325 TABLET ORAL at 21:24

## 2024-01-01 RX ADMIN — SODIUM CHLORIDE: 9 INJECTION, SOLUTION INTRAVENOUS at 12:55

## 2024-01-01 RX ADMIN — CARBIDOPA AND LEVODOPA 1.5 TABLET: 25; 100 TABLET ORAL at 10:00

## 2024-01-01 RX ADMIN — Medication: at 21:31

## 2024-01-01 RX ADMIN — ACETAMINOPHEN 650 MG: 325 TABLET ORAL at 08:52

## 2024-01-01 RX ADMIN — COLLAGENASE SANTYL: 250 OINTMENT TOPICAL at 09:00

## 2024-01-01 RX ADMIN — MEROPENEM 1000 MG: 1 INJECTION, POWDER, FOR SOLUTION INTRAVENOUS at 03:17

## 2024-01-01 RX ADMIN — CARBIDOPA AND LEVODOPA 1.5 TABLET: 25; 100 TABLET ORAL at 17:18

## 2024-01-01 RX ADMIN — ONDANSETRON 4 MG: 2 INJECTION INTRAMUSCULAR; INTRAVENOUS at 14:50

## 2024-01-01 RX ADMIN — MEMANTINE 10 MG: 5 TABLET ORAL at 21:39

## 2024-01-01 RX ADMIN — MEROPENEM 1000 MG: 1 INJECTION, POWDER, FOR SOLUTION INTRAVENOUS at 03:51

## 2024-01-01 RX ADMIN — CARBIDOPA AND LEVODOPA 1.5 TABLET: 25; 100 TABLET ORAL at 21:12

## 2024-01-01 RX ADMIN — VANCOMYCIN HYDROCHLORIDE 1250 MG: 1.25 INJECTION, POWDER, LYOPHILIZED, FOR SOLUTION INTRAVENOUS at 15:09

## 2024-01-01 RX ADMIN — WATER 2000 MG: 1 INJECTION INTRAMUSCULAR; INTRAVENOUS; SUBCUTANEOUS at 14:32

## 2024-01-01 RX ADMIN — QUETIAPINE FUMARATE 12.5 MG: 25 TABLET ORAL at 18:20

## 2024-01-01 RX ADMIN — ACETAMINOPHEN 650 MG: 325 TABLET ORAL at 14:53

## 2024-01-01 RX ADMIN — ACETAMINOPHEN 650 MG: 325 TABLET ORAL at 22:52

## 2024-01-01 RX ADMIN — COLLAGENASE SANTYL: 250 OINTMENT TOPICAL at 08:19

## 2024-01-01 ASSESSMENT — PAIN SCALES - GENERAL
PAINLEVEL_OUTOF10: 5
PAINLEVEL_OUTOF10: 0
PAINLEVEL_OUTOF10: 5
PAINLEVEL_OUTOF10: 0
PAINLEVEL_OUTOF10: 5
PAINLEVEL_OUTOF10: 0
PAINLEVEL_OUTOF10: 5
PAINLEVEL_OUTOF10: 0
PAINLEVEL_OUTOF10: 3
PAINLEVEL_OUTOF10: 0

## 2024-01-01 ASSESSMENT — PAIN DESCRIPTION - DESCRIPTORS
DESCRIPTORS: ACHING

## 2024-01-01 ASSESSMENT — LIFESTYLE VARIABLES
HOW OFTEN DO YOU HAVE A DRINK CONTAINING ALCOHOL: NEVER
HOW MANY STANDARD DRINKS CONTAINING ALCOHOL DO YOU HAVE ON A TYPICAL DAY: PATIENT DOES NOT DRINK

## 2024-01-01 ASSESSMENT — PAIN DESCRIPTION - ORIENTATION
ORIENTATION: RIGHT
ORIENTATION: LEFT
ORIENTATION: RIGHT
ORIENTATION: LEFT
ORIENTATION: RIGHT

## 2024-01-01 ASSESSMENT — PAIN SCALES - WONG BAKER
WONGBAKER_NUMERICALRESPONSE: NO HURT
WONGBAKER_NUMERICALRESPONSE: HURTS A LITTLE BIT
WONGBAKER_NUMERICALRESPONSE: NO HURT

## 2024-01-01 ASSESSMENT — PAIN DESCRIPTION - LOCATION
LOCATION: LEG

## 2024-01-01 ASSESSMENT — PAIN - FUNCTIONAL ASSESSMENT: PAIN_FUNCTIONAL_ASSESSMENT: NONE - DENIES PAIN

## 2024-04-26 ENCOUNTER — HOSPITAL ENCOUNTER (INPATIENT)
Facility: HOSPITAL | Age: 74
LOS: 4 days | Discharge: NURSING, REHAB OR LONG-TERM CARE | DRG: 481 | End: 2024-04-30
Attending: STUDENT IN AN ORGANIZED HEALTH CARE EDUCATION/TRAINING PROGRAM | Admitting: FAMILY MEDICINE
Payer: MEDICARE

## 2024-04-26 ENCOUNTER — APPOINTMENT (OUTPATIENT)
Facility: HOSPITAL | Age: 74
DRG: 481 | End: 2024-04-26
Payer: MEDICARE
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
## 2024-04-26 DIAGNOSIS — S72.492A CLOSED COMMINUTED INTRA-ARTICULAR FRACTURE OF DISTAL END OF LEFT FEMUR, INITIAL ENCOUNTER (HCC): Primary | ICD-10-CM

## 2024-04-26 DIAGNOSIS — S72.142D CLOSED DISPLACED INTERTROCHANTERIC FRACTURE OF LEFT FEMUR WITH ROUTINE HEALING, SUBSEQUENT ENCOUNTER: ICD-10-CM

## 2024-04-26 PROBLEM — S72.92XA CLOSED FRACTURE OF LEFT FEMUR, INITIAL ENCOUNTER (HCC): Status: ACTIVE | Noted: 2024-04-26

## 2024-04-26 PROBLEM — S72.002A CLOSED LEFT HIP FRACTURE, INITIAL ENCOUNTER (HCC): Status: ACTIVE | Noted: 2024-04-26

## 2024-04-26 PROCEDURE — APPNB45 APP NON BILLABLE 31-45 MINUTES: Performed by: PHYSICIAN ASSISTANT

## 2024-04-26 PROCEDURE — 72170 X-RAY EXAM OF PELVIS: CPT

## 2024-04-26 PROCEDURE — 6370000000 HC RX 637 (ALT 250 FOR IP): Performed by: NURSE PRACTITIONER

## 2024-04-26 PROCEDURE — 72125 CT NECK SPINE W/O DYE: CPT

## 2024-04-26 PROCEDURE — 2580000003 HC RX 258: Performed by: NURSE PRACTITIONER

## 2024-04-26 PROCEDURE — 71045 X-RAY EXAM CHEST 1 VIEW: CPT

## 2024-04-26 PROCEDURE — 70450 CT HEAD/BRAIN W/O DYE: CPT

## 2024-04-26 PROCEDURE — 90471 IMMUNIZATION ADMIN: CPT | Performed by: STUDENT IN AN ORGANIZED HEALTH CARE EDUCATION/TRAINING PROGRAM

## 2024-04-26 PROCEDURE — 73552 X-RAY EXAM OF FEMUR 2/>: CPT

## 2024-04-26 PROCEDURE — 6360000002 HC RX W HCPCS: Performed by: STUDENT IN AN ORGANIZED HEALTH CARE EDUCATION/TRAINING PROGRAM

## 2024-04-26 PROCEDURE — 90714 TD VACC NO PRESV 7 YRS+ IM: CPT | Performed by: STUDENT IN AN ORGANIZED HEALTH CARE EDUCATION/TRAINING PROGRAM

## 2024-04-26 PROCEDURE — 99285 EMERGENCY DEPT VISIT HI MDM: CPT

## 2024-04-26 PROCEDURE — 1100000000 HC RM PRIVATE

## 2024-04-26 RX ORDER — SODIUM CHLORIDE, SODIUM LACTATE, POTASSIUM CHLORIDE, CALCIUM CHLORIDE 600; 310; 30; 20 MG/100ML; MG/100ML; MG/100ML; MG/100ML
INJECTION, SOLUTION INTRAVENOUS CONTINUOUS
Status: CANCELLED | OUTPATIENT
Start: 2024-04-26

## 2024-04-26 RX ORDER — TRAMADOL HYDROCHLORIDE 50 MG/1
50 TABLET ORAL EVERY 8 HOURS PRN
Status: DISCONTINUED | OUTPATIENT
Start: 2024-04-26 | End: 2024-04-30 | Stop reason: HOSPADM

## 2024-04-26 RX ORDER — TETANUS AND DIPHTHERIA TOXOIDS ADSORBED 2; 2 [LF]/.5ML; [LF]/.5ML
0.5 INJECTION INTRAMUSCULAR ONCE
Status: COMPLETED | OUTPATIENT
Start: 2024-04-26 | End: 2024-04-26

## 2024-04-26 RX ORDER — ESCITALOPRAM OXALATE 10 MG/1
20 TABLET ORAL DAILY
Status: DISCONTINUED | OUTPATIENT
Start: 2024-04-27 | End: 2024-04-30 | Stop reason: HOSPADM

## 2024-04-26 RX ORDER — LIDOCAINE HYDROCHLORIDE 10 MG/ML
1 INJECTION, SOLUTION EPIDURAL; INFILTRATION; INTRACAUDAL; PERINEURAL
Status: CANCELLED | OUTPATIENT
Start: 2024-04-26 | End: 2024-04-27

## 2024-04-26 RX ORDER — SODIUM CHLORIDE 0.9 % (FLUSH) 0.9 %
5-40 SYRINGE (ML) INJECTION PRN
Status: DISCONTINUED | OUTPATIENT
Start: 2024-04-26 | End: 2024-04-30 | Stop reason: HOSPADM

## 2024-04-26 RX ORDER — POLYETHYLENE GLYCOL 3350 17 G/17G
17 POWDER, FOR SOLUTION ORAL DAILY PRN
Status: DISCONTINUED | OUTPATIENT
Start: 2024-04-26 | End: 2024-04-28

## 2024-04-26 RX ORDER — LANOLIN ALCOHOL/MO/W.PET/CERES
6 CREAM (GRAM) TOPICAL DAILY
COMMUNITY

## 2024-04-26 RX ORDER — FINASTERIDE 5 MG/1
5 TABLET, FILM COATED ORAL NIGHTLY
Status: DISCONTINUED | OUTPATIENT
Start: 2024-04-26 | End: 2024-04-30 | Stop reason: HOSPADM

## 2024-04-26 RX ORDER — ACETAMINOPHEN 500 MG
1000 TABLET ORAL ONCE
Status: CANCELLED | OUTPATIENT
Start: 2024-04-26 | End: 2024-04-26

## 2024-04-26 RX ORDER — SODIUM CHLORIDE 0.9 % (FLUSH) 0.9 %
5-40 SYRINGE (ML) INJECTION PRN
Status: CANCELLED | OUTPATIENT
Start: 2024-04-26

## 2024-04-26 RX ORDER — MIDAZOLAM HYDROCHLORIDE 2 MG/2ML
2 INJECTION, SOLUTION INTRAMUSCULAR; INTRAVENOUS
Status: CANCELLED | OUTPATIENT
Start: 2024-04-26 | End: 2024-04-27

## 2024-04-26 RX ORDER — ACETAMINOPHEN 650 MG/1
650 SUPPOSITORY RECTAL EVERY 6 HOURS PRN
Status: DISCONTINUED | OUTPATIENT
Start: 2024-04-26 | End: 2024-04-27

## 2024-04-26 RX ORDER — SODIUM CHLORIDE 0.9 % (FLUSH) 0.9 %
5-40 SYRINGE (ML) INJECTION EVERY 12 HOURS SCHEDULED
Status: DISCONTINUED | OUTPATIENT
Start: 2024-04-26 | End: 2024-04-30 | Stop reason: HOSPADM

## 2024-04-26 RX ORDER — SODIUM CHLORIDE 9 MG/ML
INJECTION, SOLUTION INTRAVENOUS PRN
Status: CANCELLED | OUTPATIENT
Start: 2024-04-26

## 2024-04-26 RX ORDER — RIVASTIGMINE 13.3 MG/24H
1 PATCH, EXTENDED RELEASE TRANSDERMAL DAILY
Status: DISCONTINUED | OUTPATIENT
Start: 2024-04-27 | End: 2024-04-27 | Stop reason: SDUPTHER

## 2024-04-26 RX ORDER — ACETAMINOPHEN 325 MG/1
650 TABLET ORAL EVERY 6 HOURS PRN
Status: DISCONTINUED | OUTPATIENT
Start: 2024-04-26 | End: 2024-04-27

## 2024-04-26 RX ORDER — ONDANSETRON 2 MG/ML
4 INJECTION INTRAMUSCULAR; INTRAVENOUS EVERY 6 HOURS PRN
Status: DISCONTINUED | OUTPATIENT
Start: 2024-04-26 | End: 2024-04-27 | Stop reason: SDUPTHER

## 2024-04-26 RX ORDER — PANTOPRAZOLE SODIUM 40 MG/1
40 TABLET, DELAYED RELEASE ORAL
Status: DISCONTINUED | OUTPATIENT
Start: 2024-04-27 | End: 2024-04-30 | Stop reason: HOSPADM

## 2024-04-26 RX ORDER — MEMANTINE HYDROCHLORIDE 10 MG/1
10 TABLET ORAL 2 TIMES DAILY
Status: DISCONTINUED | OUTPATIENT
Start: 2024-04-26 | End: 2024-04-30 | Stop reason: HOSPADM

## 2024-04-26 RX ORDER — SULFAMETHOXAZOLE AND TRIMETHOPRIM 800; 160 MG/1; MG/1
1 TABLET ORAL 2 TIMES DAILY
Status: DISPENSED | OUTPATIENT
Start: 2024-04-26 | End: 2024-04-28

## 2024-04-26 RX ORDER — SODIUM CHLORIDE 9 MG/ML
INJECTION, SOLUTION INTRAVENOUS PRN
Status: DISCONTINUED | OUTPATIENT
Start: 2024-04-26 | End: 2024-04-30 | Stop reason: HOSPADM

## 2024-04-26 RX ORDER — LANOLIN ALCOHOL/MO/W.PET/CERES
6 CREAM (GRAM) TOPICAL NIGHTLY
Status: DISCONTINUED | OUTPATIENT
Start: 2024-04-26 | End: 2024-04-30 | Stop reason: HOSPADM

## 2024-04-26 RX ORDER — SODIUM CHLORIDE 9 MG/ML
INJECTION, SOLUTION INTRAVENOUS CONTINUOUS
Status: DISCONTINUED | OUTPATIENT
Start: 2024-04-26 | End: 2024-04-30 | Stop reason: HOSPADM

## 2024-04-26 RX ORDER — SULFAMETHOXAZOLE AND TRIMETHOPRIM 800; 160 MG/1; MG/1
1 TABLET ORAL 2 TIMES DAILY
Status: ON HOLD | COMMUNITY
End: 2024-04-30 | Stop reason: HOSPADM

## 2024-04-26 RX ORDER — ONDANSETRON 4 MG/1
4 TABLET, ORALLY DISINTEGRATING ORAL EVERY 8 HOURS PRN
Status: DISCONTINUED | OUTPATIENT
Start: 2024-04-26 | End: 2024-04-27 | Stop reason: SDUPTHER

## 2024-04-26 RX ORDER — FENTANYL CITRATE 50 UG/ML
100 INJECTION, SOLUTION INTRAMUSCULAR; INTRAVENOUS
Status: CANCELLED | OUTPATIENT
Start: 2024-04-26 | End: 2024-04-27

## 2024-04-26 RX ORDER — SODIUM CHLORIDE 0.9 % (FLUSH) 0.9 %
5-40 SYRINGE (ML) INJECTION EVERY 12 HOURS SCHEDULED
Status: CANCELLED | OUTPATIENT
Start: 2024-04-26

## 2024-04-26 RX ADMIN — SULFAMETHOXAZOLE AND TRIMETHOPRIM 1 TABLET: 800; 160 TABLET ORAL at 21:26

## 2024-04-26 RX ADMIN — CARBIDOPA AND LEVODOPA 1 TABLET: 25; 100 TABLET ORAL at 21:25

## 2024-04-26 RX ADMIN — SODIUM CHLORIDE, PRESERVATIVE FREE 10 ML: 5 INJECTION INTRAVENOUS at 21:25

## 2024-04-26 RX ADMIN — Medication 6 MG: at 21:26

## 2024-04-26 RX ADMIN — ACETAMINOPHEN 650 MG: 325 TABLET ORAL at 17:06

## 2024-04-26 RX ADMIN — ACETAMINOPHEN 650 MG: 325 TABLET ORAL at 22:48

## 2024-04-26 RX ADMIN — MEMANTINE HYDROCHLORIDE 10 MG: 10 TABLET ORAL at 21:26

## 2024-04-26 RX ADMIN — TETANUS AND DIPHTHERIA TOXOIDS ADSORBED 0.5 ML: 2; 2 INJECTION INTRAMUSCULAR at 12:34

## 2024-04-26 RX ADMIN — SODIUM CHLORIDE: 9 INJECTION, SOLUTION INTRAVENOUS at 21:38

## 2024-04-26 RX ADMIN — FINASTERIDE 5 MG: 5 TABLET, FILM COATED ORAL at 21:26

## 2024-04-26 ASSESSMENT — PAIN SCALES - GENERAL: PAINLEVEL_OUTOF10: 3

## 2024-04-26 ASSESSMENT — PAIN DESCRIPTION - ORIENTATION: ORIENTATION: LEFT

## 2024-04-26 ASSESSMENT — PAIN DESCRIPTION - DESCRIPTORS: DESCRIPTORS: ACHING

## 2024-04-26 ASSESSMENT — PAIN DESCRIPTION - LOCATION: LOCATION: LEG

## 2024-04-26 NOTE — ED NOTES
Running note:    1255: Pt diaper soiled. Patient was cleaned and changed into clean diaper.    1306: Nurse Jose Juan attempt to give report. Phone number left with floor for call back.    1353:TRANSFER - OUT REPORT:    Verbal report given to RN Lisa on Boo Morelos  being transferred to  for routine progression of patient care       Report consisted of patient's Situation, Background, Assessment and   Recommendations(SBAR).     Information from the following report(s) Nurse Handoff Report, Index, ED Encounter Summary, ED SBAR, and MAR was reviewed with the receiving nurse.    Bell Fall Assessment:    Presents to emergency department  because of falls (Syncope, seizure, or loss of consciousness): Yes  Age > 70: Yes  Altered Mental Status, Intoxication with alcohol or substance confusion (Disorientation, impaired judgment, poor safety awaremess, or inability to follow instructions): Yes (patient is at baseline)  Impaired Mobility: Ambulates or transfers with assistive devices or assistance; Unable to ambulate or transer.: Yes  Nursing Judgement: Yes          Lines:   Peripheral IV 04/26/24 Left Forearm (Active)   Site Assessment Clean, dry & intact 04/26/24 1301   Line Status Brisk blood return;Flushed;Normal saline locked;Specimen collected 04/26/24 1301   Line Care Connections checked and tightened 04/26/24 1301   Dressing Status New dressing applied;Clean, dry & intact 04/26/24 1301   Dressing Type Transparent 04/26/24 1301   Dressing Intervention New 04/26/24 1301        Opportunity for questions and clarification was provided.      Patient transported with:  [facility]      1440: [facility] arrived. Report was given to team with all responsible paperwork.

## 2024-04-26 NOTE — ACP (ADVANCE CARE PLANNING)
Advance Care Planning     General Advance Care Planning (ACP) Conversation    Date of Conversation: 4/26/2024  Conducted with: Healthcare Decision Maker  Other persons present: None    Healthcare Decision Maker: Healthcare decision maker is Susan Morelos [phone]    Content/Action Overview:  Has ACP document(s) NOT on file - requested patient to provide Wife reports she can bring in copy of advance directive and POA paperwork tomorrow  Had not yet completed a DDNR, was interested in discussing this topic and completing paperwork this afternoon. Ms. Morelos indicates that she and her  have discussed care in the past, he would not want resuscitation nor intubation in an acute situation.  She verbalized understanding that patient would continue to receive treatment and would have acute needs addressed if and when needed.    Reviewed DNR/DNI and patient elects DNR order - completed portable DNR form & placed order    Length of Voluntary ACP Conversation in minutes:  16 minutes    CARI Govea - NP

## 2024-04-26 NOTE — ED PROVIDER NOTES
[facility]  EMERGENCY DEPARTMENT ENCOUNTER      Pt Name: Boo Morelos  MRN: [id number]  Birthdate 1950  Date of evaluation: 4/26/2024  Provider: Analy Enriquez MD    CHIEF COMPLAINT       Chief Complaint   Patient presents with    Leg Pain    Fall    Head Injury         HISTORY OF PRESENT ILLNESS   (Location/Symptom, Timing/Onset, Context/Setting, Quality, Duration, Modifying Factors, Severity)  Note limiting factors.   The history is provided by the patient and the spouse.     73-year-old male with history of cancer, dementia, Parkinson disease presenting for fall.  Reports ambulating through the lobby, 30 minutes prior to arrival, lost balance and fell landing on left leg.  Patient hit his head, has an abrasion, no loss of consciousness, at his mental status baseline per wife at bedside.  Patient has a history of balance problems related to his Parkinson's.  Patient is currently being treated for UTI outpatient..  Patient is not on anticoagulants    Review of External Medical Records:         Nursing Notes were reviewed.      REVIEW OF SYSTEMS    (2-9 systems for level 4, 10 or more for level 5)     Except as noted above the remainder of the review of systems was reviewed and negative.       PAST MEDICAL HISTORY     Past Medical History:   Diagnosis Date    Cancer (HCC)     Dementia (HCC)     Parkinson disease (HCC)          SURGICAL HISTORY       Past Surgical History:   Procedure Laterality Date    TURP           CURRENT MEDICATIONS       Previous Medications    CARBIDOPA-LEVODOPA EN    by Enteral route in the morning, at noon, in the evening, and at bedtime    ESCITALOPRAM (LEXAPRO) 10 MG TABLET    Take 1 tablet by mouth daily    FINASTERIDE PO    Take 5 mg by mouth    MEMANTINE (NAMENDA) 5 MG TABLET    Take 1 tablet by mouth 2 times daily    PANTOPRAZOLE (PROTONIX) 40 MG TABLET        RIVASTIGMINE (EXELON) 13.3 MG/24HR    Exelon Patch 13.3 mg/24 hour transdermal   Apply 1 patch every day by

## 2024-04-26 NOTE — H&P
History and Physical    Date of Service:  4/26/2024  Primary Care Provider: Luis Pettit DO  Source of information: Chart review and Spouse/family member    Chief Complaint: Leg Pain, Fall, and Head Injury    History of Presenting Illness:   Boo Morelos is a 73 y.o. male with history of cancer, dementia and Parkinsons disease presented to [place] ED after a fall.  He was walking, lost balance and fell landing on his left leg.  Patient hit his head and has an abrasion but denied loss of consciousness and is at his baseline mental status baseline per wife.  Patient has a history of balance problems related to his Parkinson's.  X-ray showed a comminuted intra-articular fracture of the left femur and he was transferred to [facility] for further evaluation, treatment and surgical needs.    Patient was seen and examined this afternoon, he was sitting up in bed in no acute distress, cooperative and interactive with assessment.  Patient is pleasantly confused, oriented to himself and wife only.  Denies any headaches, chest pain, chest pressure, shortness of breath.  Denies any GI complaints such as nausea, vomiting.  Review of systems was limited by patient's cognitive status.  Patient's wife reports that he has been doing fairly well at home, ambulates without an assistive device.  Recently, he has been treated for UTI with Bactrim and has 3 doses left on his prescription.  Cognitively, he is usually alert and oriented to himself and family.  He does have some sundowning tendencies and has increased confusion at times.    Medication reconciliation completed with wife at bedside.    Neurologist: Dr. Jacobo at [facility]  PCP Lusi Pettit       REVIEW OF SYSTEMS:  As mentioned above in the HPI    Past Medical History:   Diagnosis Date    Cancer (HCC)     Dementia (HCC)     Parkinson disease (HCC)       Past Surgical History:   Procedure Laterality Date    TURP       Prior to Admission medications   Multilevel degenerative change.      XR CHEST 1 VIEW   Final Result   1. No acute process.      XR FEMUR LEFT (MIN 2 VIEWS)   Final Result   Comminuted intra-articular fracture of the left femur.         XR PELVIS (1-2 VIEWS)   Final Result   Comminuted intra-articular fracture of the left femur.            Notes reviewed from all clinical/nonclinical/nursing services involved in patient's clinical care. Care coordination discussions were held with appropriate clinical/nonclinical/ nursing providers based on care coordination needs.     Plan:     Closed fracture of the Left Femur  - XR comminuted intra-articular fracture of left femur  - Orthopedics has been consulted, plan for OR tomorrow  - SCDs for now, lovenox likely in am  - tylenol prn for mid pain. Tramadol for moderate-severe pain    Hx Dementia  Hx Parkinsons  - CT scan of his head was negative for acute process; CT of the cervical spine showed no acute bony abnormality and multilevel degenerative change post fall (hit his head)  - resume home medications  - bed alarm requested    Hx Recent UTI  - on Bactrim, needs 3 more doses to complete treatment (ordered)  - Wife indicates that plan was to repeat UA ~2 to 3 days posttreatment to ensure UTI had resolved    DIET: ADULT DIET; Regular   ISOLATION PRECAUTIONS: No active isolations  CODE STATUS: DNR  - completed documents today  Central Line:   none  DVT PROPHYLAXIS: SCDs  FUNCTIONAL STATUS PRIOR TO HOSPITALIZATION: Fully active and ambulatory; able to carry on all self-care without restriction.  Ambulatory status/function: By self   EARLY MOBILITY ASSESSMENT: Recommend an assessment from physical therapy and/or occupational therapy  ANTICIPATED DISCHARGE: Greater than 48 hours.  ANTICIPATED DISPOSITION: Home  EMERGENCY CONTACT/SURROGATE DECISION MAKER: Susan Morelos [phone]    CRITICAL CARE WAS PERFORMED FOR THIS ENCOUNTER: NO.    Signed By: CARI Govea - NP     April 26, 2024       Please note

## 2024-04-26 NOTE — CONSULTS
Geriatric Fracture Consult  Patient: Boo Morelos  YOB: 1950   MRN: [id number]      Consult Date: 4/26/2024     Consultant: Reynaldo Del Valle PA-C    Chief Complaint: Left hip pain, intertrochanteric femur fracture  ED Presentation Time: < 8 hours  Mechanism of Injury: Fall from standing  Ambulatory Status: Independent  Past Medical History:   Past Medical History:   Diagnosis Date    Cancer (HCC)     Dementia (HCC)     Parkinson disease (HCC)        Allergies: No Known Allergies   Past Surgical History:   Past Surgical History:   Procedure Laterality Date    TURP        Social History:   Social History     Socioeconomic History    Marital status:      Spouse name: Not on file    Number of children: Not on file    Years of education: Not on file    Highest education level: Not on file   Occupational History    Not on file   Tobacco Use    Smoking status: Never     Passive exposure: Never    Smokeless tobacco: Never   Vaping Use    Vaping Use: Never used   Substance and Sexual Activity    Alcohol use: Never    Drug use: Never    Sexual activity: Not on file   Other Topics Concern    Not on file   Social History Narrative    Not on file     Social Determinants of Health     Financial Resource Strain: Not on file   Food Insecurity: No Food Insecurity (4/26/2024)    Hunger Vital Sign     Worried About Running Out of Food in the Last Year: Never true     Ran Out of Food in the Last Year: Never true   Transportation Needs: Not on file   Physical Activity: Not on file   Stress: Not on file   Social Connections: Not on file   Intimate Partner Violence: Not on file   Housing Stability: Not on file      Dwelling Status: Alone with Spouse/Significant Other  Current Anticoagulant Medications: Aspirin  History of falls: Yes  Prior Fractures: left shoulder fracture last year from a fall    Labs:    Lab Results   Component Value Date/Time    HGB 14.1 12/25/2023 11:58 AM    WBC 6.4 12/25/2023 11:58 AM

## 2024-04-26 NOTE — ED TRIAGE NOTES
Patient arrives by EMS from [facility] with c/o GLF about 30 minutes ago while walking in the lobby at the facility. NO LOC. No blood thinners. On daily ASA. Has an abrasion to the top of his head. Patient guarding his right upper leg. Patient is currently being treated for UTI with antibiotics. Patient at baseline per wife.

## 2024-04-27 ENCOUNTER — APPOINTMENT (OUTPATIENT)
Facility: HOSPITAL | Age: 74
DRG: 481 | End: 2024-04-27
Payer: MEDICARE

## 2024-04-27 ENCOUNTER — ANESTHESIA EVENT (OUTPATIENT)
Facility: HOSPITAL | Age: 74
End: 2024-04-27
Payer: MEDICARE

## 2024-04-27 ENCOUNTER — ANESTHESIA (OUTPATIENT)
Facility: HOSPITAL | Age: 74
End: 2024-04-27
Payer: MEDICARE

## 2024-04-27 PROBLEM — S72.142A CLOSED DISPLACED INTERTROCHANTERIC FRACTURE OF LEFT FEMUR (HCC): Status: ACTIVE | Noted: 2024-04-27

## 2024-04-27 LAB
ANION GAP SERPL CALC-SCNC: 7 MMOL/L (ref 5–15)
BUN SERPL-MCNC: 23 MG/DL (ref 6–20)
BUN/CREAT SERPL: 20 (ref 12–20)
CALCIUM SERPL-MCNC: 8.6 MG/DL (ref 8.5–10.1)
CHLORIDE SERPL-SCNC: 105 MMOL/L (ref 97–108)
CO2 SERPL-SCNC: 28 MMOL/L (ref 21–32)
CREAT SERPL-MCNC: 1.16 MG/DL (ref 0.7–1.3)
GLUCOSE SERPL-MCNC: 93 MG/DL (ref 65–100)
POTASSIUM SERPL-SCNC: 4 MMOL/L (ref 3.5–5.1)
SODIUM SERPL-SCNC: 140 MMOL/L (ref 136–145)

## 2024-04-27 PROCEDURE — 2580000003 HC RX 258: Performed by: STUDENT IN AN ORGANIZED HEALTH CARE EDUCATION/TRAINING PROGRAM

## 2024-04-27 PROCEDURE — 2580000003 HC RX 258

## 2024-04-27 PROCEDURE — 0QS706Z REPOSITION LEFT UPPER FEMUR WITH INTRAMEDULLARY INTERNAL FIXATION DEVICE, OPEN APPROACH: ICD-10-PCS | Performed by: STUDENT IN AN ORGANIZED HEALTH CARE EDUCATION/TRAINING PROGRAM

## 2024-04-27 PROCEDURE — 7100000000 HC PACU RECOVERY - FIRST 15 MIN: Performed by: STUDENT IN AN ORGANIZED HEALTH CARE EDUCATION/TRAINING PROGRAM

## 2024-04-27 PROCEDURE — 6360000002 HC RX W HCPCS: Performed by: ANESTHESIOLOGY

## 2024-04-27 PROCEDURE — C1713 ANCHOR/SCREW BN/BN,TIS/BN: HCPCS | Performed by: STUDENT IN AN ORGANIZED HEALTH CARE EDUCATION/TRAINING PROGRAM

## 2024-04-27 PROCEDURE — 6360000002 HC RX W HCPCS

## 2024-04-27 PROCEDURE — 6360000002 HC RX W HCPCS: Performed by: STUDENT IN AN ORGANIZED HEALTH CARE EDUCATION/TRAINING PROGRAM

## 2024-04-27 PROCEDURE — 7100000001 HC PACU RECOVERY - ADDTL 15 MIN: Performed by: STUDENT IN AN ORGANIZED HEALTH CARE EDUCATION/TRAINING PROGRAM

## 2024-04-27 PROCEDURE — 6370000000 HC RX 637 (ALT 250 FOR IP): Performed by: STUDENT IN AN ORGANIZED HEALTH CARE EDUCATION/TRAINING PROGRAM

## 2024-04-27 PROCEDURE — 99233 SBSQ HOSP IP/OBS HIGH 50: CPT | Performed by: STUDENT IN AN ORGANIZED HEALTH CARE EDUCATION/TRAINING PROGRAM

## 2024-04-27 PROCEDURE — 36415 COLL VENOUS BLD VENIPUNCTURE: CPT

## 2024-04-27 PROCEDURE — 1100000000 HC RM PRIVATE

## 2024-04-27 PROCEDURE — C1769 GUIDE WIRE: HCPCS | Performed by: STUDENT IN AN ORGANIZED HEALTH CARE EDUCATION/TRAINING PROGRAM

## 2024-04-27 PROCEDURE — 2500000003 HC RX 250 WO HCPCS

## 2024-04-27 PROCEDURE — 2580000003 HC RX 258: Performed by: NURSE PRACTITIONER

## 2024-04-27 PROCEDURE — 6370000000 HC RX 637 (ALT 250 FOR IP): Performed by: NURSE PRACTITIONER

## 2024-04-27 PROCEDURE — 3600000002 HC SURGERY LEVEL 2 BASE: Performed by: STUDENT IN AN ORGANIZED HEALTH CARE EDUCATION/TRAINING PROGRAM

## 2024-04-27 PROCEDURE — 80048 BASIC METABOLIC PNL TOTAL CA: CPT

## 2024-04-27 PROCEDURE — 3600000012 HC SURGERY LEVEL 2 ADDTL 15MIN: Performed by: STUDENT IN AN ORGANIZED HEALTH CARE EDUCATION/TRAINING PROGRAM

## 2024-04-27 PROCEDURE — P9045 ALBUMIN (HUMAN), 5%, 250 ML: HCPCS

## 2024-04-27 PROCEDURE — 2720000010 HC SURG SUPPLY STERILE: Performed by: STUDENT IN AN ORGANIZED HEALTH CARE EDUCATION/TRAINING PROGRAM

## 2024-04-27 PROCEDURE — 27245 TREAT THIGH FRACTURE: CPT | Performed by: STUDENT IN AN ORGANIZED HEALTH CARE EDUCATION/TRAINING PROGRAM

## 2024-04-27 PROCEDURE — 3700000001 HC ADD 15 MINUTES (ANESTHESIA): Performed by: STUDENT IN AN ORGANIZED HEALTH CARE EDUCATION/TRAINING PROGRAM

## 2024-04-27 PROCEDURE — 3700000000 HC ANESTHESIA ATTENDED CARE: Performed by: STUDENT IN AN ORGANIZED HEALTH CARE EDUCATION/TRAINING PROGRAM

## 2024-04-27 PROCEDURE — 2709999900 HC NON-CHARGEABLE SUPPLY: Performed by: STUDENT IN AN ORGANIZED HEALTH CARE EDUCATION/TRAINING PROGRAM

## 2024-04-27 DEVICE — TFNA FENESTRATED SCREW 110MM - STERILE
Type: IMPLANTABLE DEVICE | Site: HIP | Status: FUNCTIONAL
Brand: TFN-ADVANCE

## 2024-04-27 DEVICE — 12MM/130 DEG TI CANN TFNA 400MM/LEFT-STERILE
Type: IMPLANTABLE DEVICE | Site: HIP | Status: FUNCTIONAL
Brand: TFN-ADVANCE

## 2024-04-27 DEVICE — SCREW BNE L42MM DIA5MM NONSTERILE TIB LT GRN TI ST CANN LOK: Type: IMPLANTABLE DEVICE | Site: HIP | Status: FUNCTIONAL

## 2024-04-27 DEVICE — SCREW BNE L44MM DIA5MM NONSTERILE TIB LT GRN TI ST CANN LOK: Type: IMPLANTABLE DEVICE | Site: HIP | Status: FUNCTIONAL

## 2024-04-27 RX ORDER — OXYCODONE HYDROCHLORIDE 5 MG/1
5 TABLET ORAL
Status: DISCONTINUED | OUTPATIENT
Start: 2024-04-27 | End: 2024-04-27 | Stop reason: HOSPADM

## 2024-04-27 RX ORDER — DEXAMETHASONE SODIUM PHOSPHATE 4 MG/ML
INJECTION, SOLUTION INTRA-ARTICULAR; INTRALESIONAL; INTRAMUSCULAR; INTRAVENOUS; SOFT TISSUE PRN
Status: DISCONTINUED | OUTPATIENT
Start: 2024-04-27 | End: 2024-04-27 | Stop reason: SDUPTHER

## 2024-04-27 RX ORDER — ONDANSETRON 2 MG/ML
INJECTION INTRAMUSCULAR; INTRAVENOUS PRN
Status: DISCONTINUED | OUTPATIENT
Start: 2024-04-27 | End: 2024-04-27 | Stop reason: SDUPTHER

## 2024-04-27 RX ORDER — FENTANYL CITRATE 50 UG/ML
INJECTION, SOLUTION INTRAMUSCULAR; INTRAVENOUS PRN
Status: DISCONTINUED | OUTPATIENT
Start: 2024-04-27 | End: 2024-04-27 | Stop reason: SDUPTHER

## 2024-04-27 RX ORDER — SENNA AND DOCUSATE SODIUM 50; 8.6 MG/1; MG/1
1 TABLET, FILM COATED ORAL 2 TIMES DAILY
Status: DISCONTINUED | OUTPATIENT
Start: 2024-04-27 | End: 2024-04-30 | Stop reason: HOSPADM

## 2024-04-27 RX ORDER — SODIUM CHLORIDE 0.9 % (FLUSH) 0.9 %
5-40 SYRINGE (ML) INJECTION EVERY 12 HOURS SCHEDULED
Status: DISCONTINUED | OUTPATIENT
Start: 2024-04-27 | End: 2024-04-30 | Stop reason: HOSPADM

## 2024-04-27 RX ORDER — HYDROMORPHONE HYDROCHLORIDE 1 MG/ML
0.5 INJECTION, SOLUTION INTRAMUSCULAR; INTRAVENOUS; SUBCUTANEOUS EVERY 5 MIN PRN
Status: DISCONTINUED | OUTPATIENT
Start: 2024-04-27 | End: 2024-04-27 | Stop reason: HOSPADM

## 2024-04-27 RX ORDER — IPRATROPIUM BROMIDE AND ALBUTEROL SULFATE 2.5; .5 MG/3ML; MG/3ML
1 SOLUTION RESPIRATORY (INHALATION)
Status: DISCONTINUED | OUTPATIENT
Start: 2024-04-27 | End: 2024-04-27 | Stop reason: HOSPADM

## 2024-04-27 RX ORDER — EPHEDRINE SULFATE 50 MG/ML
INJECTION INTRAVENOUS PRN
Status: DISCONTINUED | OUTPATIENT
Start: 2024-04-27 | End: 2024-04-27 | Stop reason: SDUPTHER

## 2024-04-27 RX ORDER — CEFAZOLIN SODIUM 1 G/3ML
INJECTION, POWDER, FOR SOLUTION INTRAMUSCULAR; INTRAVENOUS PRN
Status: DISCONTINUED | OUTPATIENT
Start: 2024-04-27 | End: 2024-04-27 | Stop reason: SDUPTHER

## 2024-04-27 RX ORDER — NALOXONE HYDROCHLORIDE 0.4 MG/ML
INJECTION, SOLUTION INTRAMUSCULAR; INTRAVENOUS; SUBCUTANEOUS PRN
Status: DISCONTINUED | OUTPATIENT
Start: 2024-04-27 | End: 2024-04-27 | Stop reason: HOSPADM

## 2024-04-27 RX ORDER — ALBUMIN, HUMAN INJ 5% 5 %
SOLUTION INTRAVENOUS PRN
Status: DISCONTINUED | OUTPATIENT
Start: 2024-04-27 | End: 2024-04-27 | Stop reason: SDUPTHER

## 2024-04-27 RX ORDER — ENOXAPARIN SODIUM 100 MG/ML
40 INJECTION SUBCUTANEOUS EVERY 24 HOURS
Status: DISCONTINUED | OUTPATIENT
Start: 2024-04-27 | End: 2024-04-30 | Stop reason: HOSPADM

## 2024-04-27 RX ORDER — PROCHLORPERAZINE EDISYLATE 5 MG/ML
5 INJECTION INTRAMUSCULAR; INTRAVENOUS
Status: DISCONTINUED | OUTPATIENT
Start: 2024-04-27 | End: 2024-04-27 | Stop reason: HOSPADM

## 2024-04-27 RX ORDER — SODIUM CHLORIDE 9 MG/ML
INJECTION, SOLUTION INTRAVENOUS CONTINUOUS
Status: DISCONTINUED | OUTPATIENT
Start: 2024-04-27 | End: 2024-04-28

## 2024-04-27 RX ORDER — DIPHENHYDRAMINE HYDROCHLORIDE 50 MG/ML
12.5 INJECTION INTRAMUSCULAR; INTRAVENOUS
Status: DISCONTINUED | OUTPATIENT
Start: 2024-04-27 | End: 2024-04-27 | Stop reason: HOSPADM

## 2024-04-27 RX ORDER — ONDANSETRON 4 MG/1
4 TABLET, ORALLY DISINTEGRATING ORAL EVERY 8 HOURS PRN
Status: DISCONTINUED | OUTPATIENT
Start: 2024-04-27 | End: 2024-04-30 | Stop reason: HOSPADM

## 2024-04-27 RX ORDER — OXYCODONE HYDROCHLORIDE 5 MG/1
2.5 TABLET ORAL EVERY 4 HOURS PRN
Status: DISCONTINUED | OUTPATIENT
Start: 2024-04-27 | End: 2024-04-28

## 2024-04-27 RX ORDER — ROCURONIUM BROMIDE 10 MG/ML
INJECTION, SOLUTION INTRAVENOUS PRN
Status: DISCONTINUED | OUTPATIENT
Start: 2024-04-27 | End: 2024-04-27 | Stop reason: SDUPTHER

## 2024-04-27 RX ORDER — LORAZEPAM 2 MG/ML
0.5 INJECTION INTRAMUSCULAR
Status: DISCONTINUED | OUTPATIENT
Start: 2024-04-27 | End: 2024-04-27 | Stop reason: HOSPADM

## 2024-04-27 RX ORDER — RIVASTIGMINE 13.3 MG/24H
1 PATCH, EXTENDED RELEASE TRANSDERMAL EVERY 24 HOURS
Status: DISCONTINUED | OUTPATIENT
Start: 2024-04-27 | End: 2024-04-30 | Stop reason: HOSPADM

## 2024-04-27 RX ORDER — SODIUM CHLORIDE 0.9 % (FLUSH) 0.9 %
5-40 SYRINGE (ML) INJECTION PRN
Status: DISCONTINUED | OUTPATIENT
Start: 2024-04-27 | End: 2024-04-30 | Stop reason: HOSPADM

## 2024-04-27 RX ORDER — SODIUM CHLORIDE 9 MG/ML
INJECTION, SOLUTION INTRAVENOUS PRN
Status: DISCONTINUED | OUTPATIENT
Start: 2024-04-27 | End: 2024-04-27 | Stop reason: HOSPADM

## 2024-04-27 RX ORDER — PROPOFOL 10 MG/ML
INJECTION, EMULSION INTRAVENOUS PRN
Status: DISCONTINUED | OUTPATIENT
Start: 2024-04-27 | End: 2024-04-27 | Stop reason: SDUPTHER

## 2024-04-27 RX ORDER — ONDANSETRON 2 MG/ML
4 INJECTION INTRAMUSCULAR; INTRAVENOUS EVERY 6 HOURS PRN
Status: DISCONTINUED | OUTPATIENT
Start: 2024-04-27 | End: 2024-04-30 | Stop reason: HOSPADM

## 2024-04-27 RX ORDER — SODIUM CHLORIDE 9 MG/ML
INJECTION, SOLUTION INTRAVENOUS PRN
Status: DISCONTINUED | OUTPATIENT
Start: 2024-04-27 | End: 2024-04-30 | Stop reason: HOSPADM

## 2024-04-27 RX ORDER — ACETAMINOPHEN 325 MG/1
650 TABLET ORAL EVERY 6 HOURS SCHEDULED
Status: DISCONTINUED | OUTPATIENT
Start: 2024-04-27 | End: 2024-04-30 | Stop reason: HOSPADM

## 2024-04-27 RX ORDER — SODIUM CHLORIDE, SODIUM LACTATE, POTASSIUM CHLORIDE, CALCIUM CHLORIDE 600; 310; 30; 20 MG/100ML; MG/100ML; MG/100ML; MG/100ML
INJECTION, SOLUTION INTRAVENOUS CONTINUOUS PRN
Status: DISCONTINUED | OUTPATIENT
Start: 2024-04-27 | End: 2024-04-27 | Stop reason: SDUPTHER

## 2024-04-27 RX ORDER — LIDOCAINE HYDROCHLORIDE 20 MG/ML
INJECTION, SOLUTION EPIDURAL; INFILTRATION; INTRACAUDAL; PERINEURAL PRN
Status: DISCONTINUED | OUTPATIENT
Start: 2024-04-27 | End: 2024-04-27 | Stop reason: SDUPTHER

## 2024-04-27 RX ORDER — SODIUM CHLORIDE 0.9 % (FLUSH) 0.9 %
5-40 SYRINGE (ML) INJECTION EVERY 12 HOURS SCHEDULED
Status: DISCONTINUED | OUTPATIENT
Start: 2024-04-27 | End: 2024-04-27 | Stop reason: HOSPADM

## 2024-04-27 RX ORDER — FENTANYL CITRATE 50 UG/ML
25 INJECTION, SOLUTION INTRAMUSCULAR; INTRAVENOUS EVERY 5 MIN PRN
Status: DISCONTINUED | OUTPATIENT
Start: 2024-04-27 | End: 2024-04-27 | Stop reason: HOSPADM

## 2024-04-27 RX ORDER — ONDANSETRON 2 MG/ML
4 INJECTION INTRAMUSCULAR; INTRAVENOUS
Status: DISCONTINUED | OUTPATIENT
Start: 2024-04-27 | End: 2024-04-27 | Stop reason: HOSPADM

## 2024-04-27 RX ORDER — BUPIVACAINE HYDROCHLORIDE 2.5 MG/ML
INJECTION, SOLUTION EPIDURAL; INFILTRATION; INTRACAUDAL PRN
Status: DISCONTINUED | OUTPATIENT
Start: 2024-04-27 | End: 2024-04-27 | Stop reason: HOSPADM

## 2024-04-27 RX ORDER — OXYCODONE HYDROCHLORIDE 5 MG/1
5 TABLET ORAL EVERY 4 HOURS PRN
Status: DISCONTINUED | OUTPATIENT
Start: 2024-04-27 | End: 2024-04-28

## 2024-04-27 RX ORDER — HYDRALAZINE HYDROCHLORIDE 20 MG/ML
10 INJECTION INTRAMUSCULAR; INTRAVENOUS
Status: DISCONTINUED | OUTPATIENT
Start: 2024-04-27 | End: 2024-04-27 | Stop reason: HOSPADM

## 2024-04-27 RX ORDER — SODIUM CHLORIDE 0.9 % (FLUSH) 0.9 %
5-40 SYRINGE (ML) INJECTION PRN
Status: DISCONTINUED | OUTPATIENT
Start: 2024-04-27 | End: 2024-04-27 | Stop reason: HOSPADM

## 2024-04-27 RX ADMIN — PHENYLEPHRINE HYDROCHLORIDE 80 MCG: 10 INJECTION INTRAVENOUS at 08:17

## 2024-04-27 RX ADMIN — PHENYLEPHRINE HYDROCHLORIDE 80 MCG: 10 INJECTION INTRAVENOUS at 08:21

## 2024-04-27 RX ADMIN — FENTANYL CITRATE 25 MCG: 50 INJECTION INTRAMUSCULAR; INTRAVENOUS at 10:35

## 2024-04-27 RX ADMIN — PHENYLEPHRINE HYDROCHLORIDE 40 MCG: 10 INJECTION INTRAVENOUS at 09:06

## 2024-04-27 RX ADMIN — SULFAMETHOXAZOLE AND TRIMETHOPRIM 1 TABLET: 800; 160 TABLET ORAL at 20:54

## 2024-04-27 RX ADMIN — LIDOCAINE HYDROCHLORIDE 60 MG: 20 INJECTION, SOLUTION EPIDURAL; INFILTRATION; INTRACAUDAL; PERINEURAL at 08:17

## 2024-04-27 RX ADMIN — ALBUMIN (HUMAN) 12.5 G: 12.5 INJECTION, SOLUTION INTRAVENOUS at 09:12

## 2024-04-27 RX ADMIN — MEMANTINE HYDROCHLORIDE 10 MG: 10 TABLET ORAL at 20:54

## 2024-04-27 RX ADMIN — ROCURONIUM BROMIDE 10 MG: 50 INJECTION INTRAVENOUS at 08:26

## 2024-04-27 RX ADMIN — SODIUM CHLORIDE: 9 INJECTION, SOLUTION INTRAVENOUS at 16:39

## 2024-04-27 RX ADMIN — FENTANYL CITRATE 25 MCG: 50 INJECTION, SOLUTION INTRAMUSCULAR; INTRAVENOUS at 08:17

## 2024-04-27 RX ADMIN — WATER 2000 MG: 1 INJECTION INTRAMUSCULAR; INTRAVENOUS; SUBCUTANEOUS at 16:36

## 2024-04-27 RX ADMIN — ENOXAPARIN SODIUM 40 MG: 100 INJECTION SUBCUTANEOUS at 16:34

## 2024-04-27 RX ADMIN — DEXAMETHASONE SODIUM PHOSPHATE 4 MG: 4 INJECTION INTRA-ARTICULAR; INTRALESIONAL; INTRAMUSCULAR; INTRAVENOUS; SOFT TISSUE at 08:26

## 2024-04-27 RX ADMIN — SUGAMMADEX 150 MG: 100 INJECTION, SOLUTION INTRAVENOUS at 09:49

## 2024-04-27 RX ADMIN — HYDROMORPHONE HYDROCHLORIDE 0.5 MG: 1 INJECTION, SOLUTION INTRAMUSCULAR; INTRAVENOUS; SUBCUTANEOUS at 10:35

## 2024-04-27 RX ADMIN — FENTANYL CITRATE 25 MCG: 50 INJECTION INTRAMUSCULAR; INTRAVENOUS at 11:11

## 2024-04-27 RX ADMIN — FINASTERIDE 5 MG: 5 TABLET, FILM COATED ORAL at 20:54

## 2024-04-27 RX ADMIN — TRAMADOL HYDROCHLORIDE 50 MG: 50 TABLET ORAL at 03:53

## 2024-04-27 RX ADMIN — ROCURONIUM BROMIDE 40 MG: 50 INJECTION INTRAVENOUS at 08:17

## 2024-04-27 RX ADMIN — CARBIDOPA AND LEVODOPA 1 TABLET: 25; 100 TABLET ORAL at 12:12

## 2024-04-27 RX ADMIN — PROPOFOL 100 MG: 10 INJECTION, EMULSION INTRAVENOUS at 08:17

## 2024-04-27 RX ADMIN — ACETAMINOPHEN 650 MG: 325 TABLET ORAL at 23:11

## 2024-04-27 RX ADMIN — Medication 6 MG: at 20:54

## 2024-04-27 RX ADMIN — SODIUM CHLORIDE, POTASSIUM CHLORIDE, SODIUM LACTATE AND CALCIUM CHLORIDE: 600; 310; 30; 20 INJECTION, SOLUTION INTRAVENOUS at 07:59

## 2024-04-27 RX ADMIN — EPHEDRINE SULFATE 10 MG: 50 INJECTION INTRAVENOUS at 09:18

## 2024-04-27 RX ADMIN — ONDANSETRON 4 MG: 2 INJECTION INTRAMUSCULAR; INTRAVENOUS at 09:04

## 2024-04-27 RX ADMIN — SENNOSIDES AND DOCUSATE SODIUM 1 TABLET: 8.6; 5 TABLET ORAL at 20:53

## 2024-04-27 RX ADMIN — CARBIDOPA AND LEVODOPA 1 TABLET: 25; 100 TABLET ORAL at 16:34

## 2024-04-27 RX ADMIN — PHENYLEPHRINE HYDROCHLORIDE 80 MCG: 10 INJECTION INTRAVENOUS at 08:54

## 2024-04-27 RX ADMIN — FENTANYL CITRATE 25 MCG: 50 INJECTION, SOLUTION INTRAMUSCULAR; INTRAVENOUS at 09:56

## 2024-04-27 RX ADMIN — SODIUM CHLORIDE: 9 INJECTION, SOLUTION INTRAVENOUS at 09:37

## 2024-04-27 RX ADMIN — CEFAZOLIN 2 G: 330 INJECTION, POWDER, FOR SOLUTION INTRAMUSCULAR; INTRAVENOUS at 08:21

## 2024-04-27 RX ADMIN — CARBIDOPA AND LEVODOPA 1 TABLET: 25; 100 TABLET ORAL at 20:53

## 2024-04-27 ASSESSMENT — PAIN DESCRIPTION - ORIENTATION
ORIENTATION: LEFT

## 2024-04-27 ASSESSMENT — PAIN DESCRIPTION - LOCATION
LOCATION: HIP
LOCATION: HIP
LOCATION: LEG
LOCATION: HIP

## 2024-04-27 ASSESSMENT — PAIN DESCRIPTION - DESCRIPTORS
DESCRIPTORS: ACHING

## 2024-04-27 ASSESSMENT — PAIN SCALES - GENERAL
PAINLEVEL_OUTOF10: 4
PAINLEVEL_OUTOF10: 10
PAINLEVEL_OUTOF10: 0
PAINLEVEL_OUTOF10: 6
PAINLEVEL_OUTOF10: 3
PAINLEVEL_OUTOF10: 0
PAINLEVEL_OUTOF10: 5

## 2024-04-27 ASSESSMENT — PAIN SCALES - WONG BAKER: WONGBAKER_NUMERICALRESPONSE: NO HURT

## 2024-04-27 NOTE — PERIOP NOTE
TRANSFER - OUT REPORT:    Verbal report given to GANESH Gonzalez(name) on Boo Morelos  being transferred to (unit) for ordered procedure       Report consisted of patient’s Situation, Background, Assessment and   Recommendations(SBAR).     Time Pre op antibiotic given:2 Ancef 0821  Anesthesia Stop time: 1010    Information from the following report(s) SBAR, OR Summary, Procedure Summary, Intake/Output, MAR, and Recent Results was reviewed with the receiving nurse.    Opportunity for questions and clarification was provided.     Is the patient on 02? No  Is the patient on a monitor? No  Is the nurse transporting with the patient? No  Surgical Waiting Area notified of patient's transfer from PACU? Yes

## 2024-04-27 NOTE — PLAN OF CARE
Problem: Skin/Tissue Integrity  Goal: Absence of new skin breakdown  Description: 1.  Monitor for areas of redness and/or skin breakdown  2.  Assess vascular access sites hourly  3.  Every 4-6 hours minimum:  Change oxygen saturation probe site  4.  Every 4-6 hours:  If on nasal continuous positive airway pressure, respiratory therapy assess nares and determine need for appliance change or resting period.  4/26/2024 2319 by Thomas Gomez LPN  Outcome: Progressing  4/26/2024 1735 by Lisa Baer RN  Outcome: Progressing     Problem: Discharge Planning  Goal: Discharge to home or other facility with appropriate resources  4/26/2024 2319 by Thomas oGmez LPN  Outcome: Progressing  4/26/2024 1735 by Lisa Baer RN  Outcome: Progressing  Flowsheets (Taken 4/26/2024 1537)  Discharge to home or other facility with appropriate resources: Identify barriers to discharge with patient and caregiver     Problem: Pain  Goal: Verbalizes/displays adequate comfort level or baseline comfort level  4/26/2024 2319 by Thomas Gomez LPN  Outcome: Progressing  4/26/2024 1735 by Lisa Baer, RN  Outcome: Progressing  Flowsheets (Taken 4/26/2024 1537)  Verbalizes/displays adequate comfort level or baseline comfort level: Encourage patient to monitor pain and request assistance     Problem: Safety - Adult  Goal: Free from fall injury  4/26/2024 2319 by Thomas Gomez LPN  Outcome: Progressing  4/26/2024 1735 by Lisa Baer, RN  Outcome: Progressing

## 2024-04-27 NOTE — ANESTHESIA PRE PROCEDURE
Department of Anesthesiology  Preprocedure Note       Name:  Boo Morelos   Age:  73 y.o.  :  1950                                          MRN:  [id number]         Date:  2024      Surgeon: Surgeon(s):  Maciej Goldman MD    Procedure: Procedure(s):  LEFT HIP CEPHALOMEDULLARY NAIL    Medications prior to admission:   Prior to Admission medications    Medication Sig Start Date End Date Taking? Authorizing Provider   PSYLLIUM PO Take 1 packet by mouth as needed for Constipation   Yes Dmitry Coburn MD   melatonin 3 MG TABS tablet Take 2 tablets by mouth daily   Yes Dmitry Coburn MD   carbidopa-levodopa (SINEMET)  MG per tablet Take 1 tablet by mouth 4 times daily   Yes Dmitry Coburn MD   sulfamethoxazole-trimethoprim (BACTRIM DS;SEPTRA DS) 800-160 MG per tablet Take 1 tablet by mouth 2 times daily   Yes Dmitry Coburn MD   FINASTERIDE PO Take 5 mg by mouth daily Takes it at night 19   Dmitry Coburn MD   pantoprazole (PROTONIX) 40 MG tablet  23   Dmitry Coburn MD   rivastigmine (EXELON) 13.3 MG/24HR Exelon Patch 13.3 mg/24 hour transdermal   Apply 1 patch every day by transdermal route for 90 days.    ProviderDmitry MD   memantine (NAMENDA) 10 MG tablet Take 1 tablet by mouth 2 times daily    Dmitry Coburn MD   escitalopram (LEXAPRO) 20 MG tablet Take 1 tablet by mouth daily    ProviderDmitry MD       Current medications:    Current Facility-Administered Medications   Medication Dose Route Frequency Provider Last Rate Last Admin    carbidopa-levodopa (SINEMET)  MG per tablet 1 tablet  1 tablet Oral 4x Daily Aletha Ramirez, APRN - NP   1 tablet at 24    escitalopram (LEXAPRO) tablet 20 mg  20 mg Oral Daily Aletha Ramirez, APRN - NP        finasteride (PROSCAR) tablet 5 mg  5 mg Oral Nightly Aletha Ramirez, APRN - NP   5 mg at 24    melatonin tablet 6 mg  6 mg Oral Nightly Aletha Ramirez, APRN - NP

## 2024-04-27 NOTE — PROGRESS NOTES
Hospitalist Progress Note  CARI Govea - NP  Answering service: [phone]        Date of Service:  2024  NAME:  Boo Morelos  :  1950  MRN:  [id number]      Admission Summary:   Mr. Morelos is a 73 y.o. male with history of cancer, dementia and Parkinsons disease presented to [facility] ED after a fall.  He was walking, lost balance and fell landing on his left leg.  Patient hit his head and has an abrasion but denied loss of consciousness and was at his baseline mental status baseline per wife.  Patient has a history of balance problems related to his Parkinson's.  X-ray showed a comminuted intra-articular fracture of the left femur and he was transferred to [facility] for further evaluation, treatment and surgical needs.     Per wife, he was being treated for UTI with Bactrim and had 3 doses left on his prescription.  Cognitively, he is usually alert and oriented to himself and family.  He does have some sundowning tendencies and has increased confusion at times.     Medication reconciliation completed with wife at bedside on admit  Neurologist: Dr. Jacoob at [facility]  PCP Luis Pettit     Interval history / Subjective:        Patient was seen and examined this afternoon, he was lying in bed resting with his eyes closed postoperatively.  He does have short verbal responses and opens his eyes to voice but closes them seconds later and falls back asleep.  Discussed plan of care with wife at bedside, continue to recover postoperatively and will continue IV fluids for now.  Diet will be advanced, Tylenol will be scheduled.    Assessment & Plan:      Closed fracture of the Left Femur  - XR comminuted intra-articular fracture of left femur  - POD 0 left hip cephalomedullary nail  - DVT prophylaxis as per orthopedics recommendations  - Scheduled Tylenol postoperatively. Tramadol for moderate-severe  patient's lab and all other diagnostic data    Notes reviewed from all clinical/nonclinical/nursing services involved in patient's clinical care. Care coordination discussions were held with appropriate clinical/nonclinical/ nursing providers based on care coordination needs.     Labs:   No results for input(s): \"WBC\", \"HGB\", \"HCT\", \"PLT\" in the last 72 hours.  Recent Labs     04/27/24  0458      K 4.0      CO2 28   BUN 23*     No results for input(s): \"ALT\", \"TP\", \"ALB\", \"GLOB\", \"GGT\", \"AML\" in the last 72 hours.    Invalid input(s): \"SGOT\", \"GPT\", \"AP\", \"TBIL\", \"TBILI\", \"AMYP\", \"LPSE\", \"HLPSE\"  No results for input(s): \"INR\", \"APTT\" in the last 72 hours.    Invalid input(s): \"PTP\"   No results for input(s): \"TIBC\", \"FERR\" in the last 72 hours.    Invalid input(s): \"FE\", \"PSAT\"   No results found for: \"FOL\", \"RBCF\"   No results for input(s): \"PH\", \"PCO2\", \"PO2\" in the last 72 hours.  No results for input(s): \"CPK\" in the last 72 hours.    Invalid input(s): \"CPKMB\", \"CKNDX\", \"TROIQ\"  No results found for: \"CHOL\", \"CHOLX\", \"CHLST\", \"CHOLV\", \"HDL\", \"HDLC\", \"LDL\", \"LDLC\", \"TGLX\", \"TRIGL\"  No results found for: \"GLUCPOC\"    Medications Reviewed:     Current Facility-Administered Medications   Medication Dose Route Frequency    carbidopa-levodopa (SINEMET)  MG per tablet 1 tablet  1 tablet Oral 4x Daily    escitalopram (LEXAPRO) tablet 20 mg  20 mg Oral Daily    finasteride (PROSCAR) tablet 5 mg  5 mg Oral Nightly    melatonin tablet 6 mg  6 mg Oral Nightly    memantine (NAMENDA) tablet 10 mg  10 mg Oral BID    pantoprazole (PROTONIX) tablet 40 mg  40 mg Oral QAM AC    sulfamethoxazole-trimethoprim (BACTRIM DS;SEPTRA DS) 800-160 MG per tablet 1 tablet  1 tablet Oral BID    rivastigmine (EXELON) 13.3 MG/24HR 1 patch  1 patch TransDERmal Daily    sodium chloride flush 0.9 % injection 5-40 mL  5-40 mL IntraVENous 2 times per day    sodium chloride flush 0.9 % injection 5-40 mL  5-40 mL IntraVENous PRN

## 2024-04-27 NOTE — OP NOTE
Operative Note      Patient: Boo Morelos  YOB: 1950  MRN: [id number]    Date of Procedure: 4/27/2024    Pre-Op Diagnosis Codes:     * Closed displaced intertrochanteric fracture of left femur, initial encounter ([facility]) [S72.142A]    Post-Op Diagnosis: Post-Op Diagnosis Codes:     * Closed displaced intertrochanteric fracture of left femur, initial encounter ([facility]) [S72.142A]       Procedure(s):  LEFT HIP CEPHALOMEDULLARY NAIL CPT 59613    Surgeon(s):  Maciej Goldman MD    Assistant:   * No surgical staff found *    Anesthesia: General    Estimated Blood Loss (mL): 150cc    Complications: None    Specimens:   * No specimens in log *    Implants:  Implant Name Type Inv. Item Serial No.  Lot No. LRB No. Used Action   NAIL IM L400MM [id number] 130DEG LNG L PROX FEM GRN TI LATASHA - [id number]  NAIL IM L400MM [id number] 130DEG LNG L PROX FEM GRN TI LATASHA  DEPUY [employer] [place]- [id number] Left 1 Implanted   SCREW BNE L110MM DIA10.35MM G TI LATASHA PERF FOR PROX FEM - [id number]  SCREW BNE L110MM DIA10.35MM G TI LATASHA PERF FOR PROX FEM  DEPUY [employer] [place]- [id number] Left 1 Implanted   SCREW BNE L44MM DIA5MM NONSTERILE TIB LT GRN TI ST LATASHA MARLON - SNA Screw/Plate/Nail/Santos SCREW BNE L44MM DIA5MM NONSTERILE TIB LT GRN TI ST LATASHA MARLON NA DEPU[employer]- NA Left 1 Implanted   SCREW BNE L42MM DIA5MM NONSTERILE TIB LT GRN TI ST LATASHA MARLON - SNA  SCREW BNE L42MM DIA5MM NONSTERILE TIB LT GRN TI ST LATASHA MARLON NA DEPUY SYNTHES [place]-WD NA Left 1 Implanted         Drains: * No LDAs found *    Findings:  Infection Present At Time Of Surgery (PATOS) (choose all levels that have infection present):  No infection present  Other Findings: Intertrochanteric fracture with subtrochanteric extension past 3 cm    Detailed Description of Procedure:   The patient was brought to the operating room and anesthesia was induced on the hospital bed.  The patient was transferred over to the [place] table in the supine position.  The left lower extremity  was prepped and draped in the usual sterile fashion.  The large C-arm was brought in to ensure the adequate AP and lateral radiographs of the right hip could be obtained.  A timeout was then performed confirming the correct patient, procedure, laterality.     The procedure was begun by reducing the fracture using traction and rotation of the LLE. Once the fracture was reduced, a guidepin was used in a percutaneous fashion to identify the proper start point on the tip of the greater trochanter using biplanar fluoroscopy as a guide.  The guidepin was then advanced using the wire .  Incision was then made along the guidepin down to the tip of the greater trochanter.  The opening reamer was then used with a soft tissue protector to enter the proximal femur.  A ball-tipped guidewire was introduced into the intramedullary canal.  Fluoroscopy was used to confirm intramedullary placement as well as adequate placement of the distal femur.  The nail was measured to a 400 mm nail.  Sequential reaming was then performed to a 13-1/2 mm reamer.  The long nail was then inserted into the femur.  An appropriate lateral fluoroscopic image of the hip was obtained to allow for proper placement of the lag screw.  A guidepin was then advanced up the femoral neck up into the femoral head.  Care was taken to achieve an appropriate tip apex distance.  A screw was measured for and then drilled for and the proximal femur.  A size 110 mm lag screw was then placed through the nail.  Using perfect [place] technique, 2 distal interlock screws were placed.  Final fluoroscopic images were obtained.  The incisions were thoroughly irrigated using sterile saline the deep fascia was closed using a #1 Vicryl suture.  The subcutaneous tissue was closed using 2-0 Vicryl suture.  30 cc of quarter percent Marcaine plain were injected around the incision sites.  The skin was closed using staples.  The incisions were dressed using 4 x 4's and Tegaderm

## 2024-04-27 NOTE — PERIOP NOTE
TRANSFER - IN REPORT:    Verbal report received from GANESH Smith on Boo Morelos  being received from  for ordered procedure      Report consisted of patient's Situation, Background, Assessment and   Recommendations(SBAR).     Information from the following report(s) Nurse Handoff Report was reviewed with the receiving nurse.    Opportunity for questions and clarification was provided.      Assessment completed upon patient's arrival to unit and care assumed.

## 2024-04-27 NOTE — ANESTHESIA POSTPROCEDURE EVALUATION
Post-Anesthesia Evaluation and Assessment    Patient: Boo Morelos MRN: [id number]  SSN: [id number]    YOB: 1950  Age: 73 y.o.  Sex: male      I have evaluated the patient and they are stable and ready for discharge from the PACU.     Cardiovascular Function/Vital Signs  Visit Vitals  /87   Pulse 75   Temp 97.7 °F (36.5 °C) (Oral)   Resp 16   Ht 1.88 m (6' 2\")   Wt 58.3 kg (128 lb 8.5 oz)   SpO2 97%   BMI 16.50 kg/m²       Patient is status post General anesthesia for Procedure(s):  LEFT HIP CEPHALOMEDULLARY NAIL.    Nausea/Vomiting: None    Postoperative hydration reviewed and adequate.    Pain:  Prehospital Treatment: No (04/26/24 1037)   Managed    Neurological Status:       At baseline    Mental Status, Level of Consciousness: Alert and  oriented to person, place, and time    Pulmonary Status:       Adequate oxygenation and airway patent    Complications related to anesthesia: None    Post-anesthesia assessment completed. No concerns    Signed By: Ramon Morales MD     April 27, 2024            Department of Anesthesiology  Postprocedure Note    Patient: Boo Morelos  MRN: [id number]  YOB: 1950  Date of evaluation: 4/27/2024    Procedure Summary       Date: 04/27/24 Room / Location: [facility] MAIN OR [address] [place] [address] MAIN OR    Anesthesia Start: 0809 Anesthesia Stop: 1014    Procedure: LEFT HIP CEPHALOMEDULLARY NAIL (Left: Hip) Diagnosis:       Closed displaced intertrochanteric fracture of left femur, initial encounter ([facility])      (Closed displaced intertrochanteric fracture of left femur, initial encounter ([facility]) [S72.142A])    Providers: Maciej Goldman MD Responsible Provider: Ramon Morales MD    Anesthesia Type: General ASA Status: 2            Anesthesia Type: General    Zane Phase I: Zane Score: 9    Zane Phase II:      Anesthesia Post Evaluation    No notable events documented.

## 2024-04-28 LAB
ANION GAP SERPL CALC-SCNC: 4 MMOL/L (ref 5–15)
BASOPHILS # BLD: 0 K/UL (ref 0–0.1)
BASOPHILS NFR BLD: 0 % (ref 0–1)
BUN SERPL-MCNC: 25 MG/DL (ref 6–20)
BUN/CREAT SERPL: 25 (ref 12–20)
CALCIUM SERPL-MCNC: 8.2 MG/DL (ref 8.5–10.1)
CHLORIDE SERPL-SCNC: 112 MMOL/L (ref 97–108)
CO2 SERPL-SCNC: 27 MMOL/L (ref 21–32)
CREAT SERPL-MCNC: 0.99 MG/DL (ref 0.7–1.3)
DIFFERENTIAL METHOD BLD: ABNORMAL
EOSINOPHIL # BLD: 0 K/UL (ref 0–0.4)
EOSINOPHIL NFR BLD: 0 % (ref 0–7)
ERYTHROCYTE [DISTWIDTH] IN BLOOD BY AUTOMATED COUNT: 15 % (ref 11.5–14.5)
GLUCOSE SERPL-MCNC: 105 MG/DL (ref 65–100)
HCT VFR BLD AUTO: 26.1 % (ref 36.6–50.3)
HGB BLD-MCNC: 8.8 G/DL (ref 12.1–17)
IMM GRANULOCYTES # BLD AUTO: 0.1 K/UL (ref 0–0.04)
IMM GRANULOCYTES NFR BLD AUTO: 1 % (ref 0–0.5)
LYMPHOCYTES # BLD: 1.7 K/UL (ref 0.8–3.5)
LYMPHOCYTES NFR BLD: 15 % (ref 12–49)
MCH RBC QN AUTO: 32.4 PG (ref 26–34)
MCHC RBC AUTO-ENTMCNC: 33.7 G/DL (ref 30–36.5)
MCV RBC AUTO: 96 FL (ref 80–99)
MONOCYTES # BLD: 1.8 K/UL (ref 0–1)
MONOCYTES NFR BLD: 16 % (ref 5–13)
NEUTS SEG # BLD: 8 K/UL (ref 1.8–8)
NEUTS SEG NFR BLD: 68 % (ref 32–75)
NRBC # BLD: 0 K/UL (ref 0–0.01)
NRBC BLD-RTO: 0 PER 100 WBC
PLATELET # BLD AUTO: 166 K/UL (ref 150–400)
PMV BLD AUTO: 10.6 FL (ref 8.9–12.9)
POTASSIUM SERPL-SCNC: 4.4 MMOL/L (ref 3.5–5.1)
RBC # BLD AUTO: 2.72 M/UL (ref 4.1–5.7)
SODIUM SERPL-SCNC: 143 MMOL/L (ref 136–145)
WBC # BLD AUTO: 11.7 K/UL (ref 4.1–11.1)

## 2024-04-28 PROCEDURE — 6370000000 HC RX 637 (ALT 250 FOR IP): Performed by: STUDENT IN AN ORGANIZED HEALTH CARE EDUCATION/TRAINING PROGRAM

## 2024-04-28 PROCEDURE — 85025 COMPLETE CBC W/AUTO DIFF WBC: CPT

## 2024-04-28 PROCEDURE — 6370000000 HC RX 637 (ALT 250 FOR IP): Performed by: NURSE PRACTITIONER

## 2024-04-28 PROCEDURE — 80048 BASIC METABOLIC PNL TOTAL CA: CPT

## 2024-04-28 PROCEDURE — 2580000003 HC RX 258: Performed by: STUDENT IN AN ORGANIZED HEALTH CARE EDUCATION/TRAINING PROGRAM

## 2024-04-28 PROCEDURE — 6360000002 HC RX W HCPCS: Performed by: STUDENT IN AN ORGANIZED HEALTH CARE EDUCATION/TRAINING PROGRAM

## 2024-04-28 PROCEDURE — 36415 COLL VENOUS BLD VENIPUNCTURE: CPT

## 2024-04-28 PROCEDURE — 1100000000 HC RM PRIVATE

## 2024-04-28 RX ORDER — POLYETHYLENE GLYCOL 3350 17 G/17G
17 POWDER, FOR SOLUTION ORAL DAILY
Status: DISCONTINUED | OUTPATIENT
Start: 2024-04-29 | End: 2024-04-30 | Stop reason: HOSPADM

## 2024-04-28 RX ADMIN — MEMANTINE HYDROCHLORIDE 10 MG: 10 TABLET ORAL at 08:58

## 2024-04-28 RX ADMIN — POLYETHYLENE GLYCOL 3350 17 GRAM ORAL POWDER PACKET 17 G: at 06:18

## 2024-04-28 RX ADMIN — Medication 6 MG: at 21:16

## 2024-04-28 RX ADMIN — CARBIDOPA AND LEVODOPA 1 TABLET: 25; 100 TABLET ORAL at 17:29

## 2024-04-28 RX ADMIN — SODIUM CHLORIDE: 9 INJECTION, SOLUTION INTRAVENOUS at 00:17

## 2024-04-28 RX ADMIN — SODIUM CHLORIDE, PRESERVATIVE FREE 10 ML: 5 INJECTION INTRAVENOUS at 08:54

## 2024-04-28 RX ADMIN — FINASTERIDE 5 MG: 5 TABLET, FILM COATED ORAL at 21:16

## 2024-04-28 RX ADMIN — SODIUM CHLORIDE, PRESERVATIVE FREE 10 ML: 5 INJECTION INTRAVENOUS at 21:17

## 2024-04-28 RX ADMIN — ACETAMINOPHEN 650 MG: 325 TABLET ORAL at 06:04

## 2024-04-28 RX ADMIN — CARBIDOPA AND LEVODOPA 1 TABLET: 25; 100 TABLET ORAL at 21:16

## 2024-04-28 RX ADMIN — WATER 2000 MG: 1 INJECTION INTRAMUSCULAR; INTRAVENOUS; SUBCUTANEOUS at 00:12

## 2024-04-28 RX ADMIN — SODIUM CHLORIDE: 9 INJECTION, SOLUTION INTRAVENOUS at 06:33

## 2024-04-28 RX ADMIN — PANTOPRAZOLE SODIUM 40 MG: 40 TABLET, DELAYED RELEASE ORAL at 06:04

## 2024-04-28 RX ADMIN — CARBIDOPA AND LEVODOPA 1 TABLET: 25; 100 TABLET ORAL at 08:57

## 2024-04-28 RX ADMIN — CARBIDOPA AND LEVODOPA 1 TABLET: 25; 100 TABLET ORAL at 12:29

## 2024-04-28 RX ADMIN — SODIUM CHLORIDE: 9 INJECTION, SOLUTION INTRAVENOUS at 14:32

## 2024-04-28 RX ADMIN — TRAMADOL HYDROCHLORIDE 50 MG: 50 TABLET ORAL at 21:16

## 2024-04-28 RX ADMIN — ESCITALOPRAM OXALATE 20 MG: 10 TABLET ORAL at 08:57

## 2024-04-28 RX ADMIN — MEMANTINE HYDROCHLORIDE 10 MG: 10 TABLET ORAL at 21:16

## 2024-04-28 RX ADMIN — SENNOSIDES AND DOCUSATE SODIUM 1 TABLET: 8.6; 5 TABLET ORAL at 21:16

## 2024-04-28 RX ADMIN — ACETAMINOPHEN 650 MG: 325 TABLET ORAL at 12:30

## 2024-04-28 RX ADMIN — ENOXAPARIN SODIUM 40 MG: 100 INJECTION SUBCUTANEOUS at 17:29

## 2024-04-28 RX ADMIN — ACETAMINOPHEN 650 MG: 325 TABLET ORAL at 17:29

## 2024-04-28 RX ADMIN — SENNOSIDES AND DOCUSATE SODIUM 1 TABLET: 8.6; 5 TABLET ORAL at 08:59

## 2024-04-28 ASSESSMENT — PAIN SCALES - GENERAL: PAINLEVEL_OUTOF10: 3

## 2024-04-28 ASSESSMENT — PAIN SCALES - WONG BAKER
WONGBAKER_NUMERICALRESPONSE: NO HURT
WONGBAKER_NUMERICALRESPONSE: HURTS WHOLE LOT

## 2024-04-28 NOTE — PROGRESS NOTES
Hospitalist Progress Note  CARI Govea NP  Answering service: [phone]        Date of Service:  2024  NAME:  Boo Morelos  :  1950  MRN:  [id number]    Admission Summary:     Mr. Morelos is a 73 y.o. male with history of cancer, dementia and Parkinsons disease presented to [place] [facility] ED after a fall.  He was walking, lost balance and fell landing on his left leg.  Patient hit his head and has an abrasion but denied loss of consciousness and was at his baseline mental status baseline per wife.  Patient has a history of balance problems related to his Parkinson's.  X-ray showed a comminuted intra-articular fracture of the left femur and he was transferred to [facility] for further evaluation, treatment and surgical needs.     Per wife, he was being treated for UTI with Bactrim and had 3 doses left on his prescription.  Cognitively, he is usually alert and oriented to himself and family.  He does have some sundowning tendencies and has increased confusion at times.     Medication reconciliation completed with wife at bedside on admit  Neurologist: Dr. Jacobo at [facility]  PCP Luis Pettit     Interval history / Subjective:        Patient was seen and examined this morning, he was sitting up in bed in no acute distress, pleasant and interactive.  He remains pleasantly confused, oriented to his self and wife only.  Orthopedic surgeon also at bedside during exam, leg examined together: incisions look clean, dry and intact and patient has no complaints of pain.    Assessment & Plan:      Closed fracture of the Left Femur  - XR comminuted intra-articular fracture of left femur  - POD 1 left hip cephalomedullary nail  - DVT prophylaxis as per orthopedics recommendations: lovenox for now  - Scheduled Tylenol postoperatively. Tramadol for moderate-severe pain  - PT/OT     Mild Leukocytosis  - could be

## 2024-04-28 NOTE — CARE COORDINATION
Care Management Initial Assessment       RUR:  15% Moderate Risk  Readmission? No  1st IM letter given? Yes - 4/27/2024  1st  letter given: No       04/28/24 1531   Service Assessment   Patient Orientation Person   Cognition Severely Impaired   History Provided By Significant Other   Primary Caregiver Spouse   Support Systems Spouse/Significant Other;Children;Family Members;Friends/Neighbors   Patient's Healthcare Decision Maker is: Named in Scanned ACP Document   PCP Verified by CM Yes   Last Visit to PCP Within last 3 months   Prior Functional Level Assistance with the following:;Bathing;Dressing;Toileting;Cooking;Housework;Shopping   Current Functional Level Assistance with the following:;Bathing;Dressing;Toileting;Cooking;Housework;Shopping   Can patient return to prior living arrangement Yes   Ability to make needs known: Fair   Family able to assist with home care needs: Yes   Would you like for me to discuss the discharge plan with any other family members/significant others, and if so, who? Yes   Social/Functional History   Lives With Spouse   Type of Home Apartment   Home Layout One level   Home Access Elevator;Level entry   Bathroom Shower/Tub Walk-in shower   Bathroom Toilet Standard   Bathroom Equipment Grab bars in shower;Built-in shower seat   Bathroom Accessibility Wheelchair accessible   Home Equipment None   Receives Help From Family   ADL Assistance Needs assistance   Bath Moderate assistance   Dressing Moderate assistance   Grooming Stand by assistance   Feeding Independent   Toileting Needs assistance   Homemaking Assistance Needs assistance   Meal Prep Total   Laundry Total   Vacuuming Total   Cleaning Total   Gardening Total   Yard Work Total   Driving Total   Shopping Total   Homemaking Responsibilities No   Ambulation Assistance Needs assistance   Transfer Assistance Needs assistance   Active  No   Patient's  Info Susan Jethro   Mode of Transportation SUV   Education

## 2024-04-28 NOTE — PROGRESS NOTES
Department of Orthopedic Surgery  Fellow Progress Note      SUBJECTIVE: NAEON. Doing well this AM    OBJECTIVE:    Physical    VITALS:  BP (!) 149/81   Pulse 84   Temp 97.7 °F (36.5 °C) (Oral)   Resp 16   Ht 1.88 m (6' 2\")   Wt 58.3 kg (128 lb 8.5 oz)   SpO2 99%   BMI 16.50 kg/m²   MSK: L hip dressings c/d/I. 4/5 adf/apf, SILT in the t/dp/sp distributions. 1+ dp pulse    Data    CBC:   Lab Results   Component Value Date/Time    WBC 11.7 04/28/2024 06:13 AM    RBC 2.72 04/28/2024 06:13 AM    HGB 8.8 04/28/2024 06:13 AM    HCT 26.1 04/28/2024 06:13 AM    MCV 96.0 04/28/2024 06:13 AM    MCH 32.4 04/28/2024 06:13 AM    MCHC 33.7 04/28/2024 06:13 AM    RDW 15.0 04/28/2024 06:13 AM     04/28/2024 06:13 AM    MPV 10.6 04/28/2024 06:13 AM       ASSESSMENT AND PLAN:      73M s/p L hip CMN    WBAT LLE  Ancef x2 doses post op completed  Continue lovenox for DVT ppx x30 days  PT  Dispo pending PT clearance and medical stability  F/u with Dr. Pires in 2 weeks for staple removal    Maciej Goldman MD  Sports Medicine Fellow  [facility]

## 2024-04-28 NOTE — PROGRESS NOTES
End of Shift Note    Bedside shift change report given to Lisa ANDERSEN (oncoming nurse) by Iona Burks LPN (offgoing nurse).  Report included the following information SBAR    Shift worked:  [id number]     Shift summary and any significant changes:     Room air  Tolerated diet  Tolerated medication  WBAT LLE  Family present during shift   Comments:  Patient family concerned PT has not worked with pt post procedure. PT/OT orders are in and explained to family patient will work with PT/OT 4/29       Activity:     Number times ambulated in hallways past shift: 0  Number of times OOB to chair past shift: 0    Cardiac:   Cardiac Monitoring: No           Access:  Current line(s): PIV     Genitourinary:   Urinary status: voiding and external catheter    Respiratory:      Chronic home O2 use?: NO  Incentive spirometer at bedside: YES       GI:     Current diet:  ADULT DIET; Regular  ADULT ORAL NUTRITION SUPPLEMENT; AM Snack, PM Snack; Standard High Calorie/High Protein Oral Supplement  Passing flatus: YES  Tolerating current diet: YES       Pain Management:   Patient states pain is manageable on current regimen: YES    Skin:     Interventions: turn team, float heels, increase time out of bed, PT/OT consult, internal/external urinary devices, and nutritional support    Patient Safety:  Fall Score:    Interventions: bed/chair alarm, assistive device (walker, cane. etc), gripper socks, pt to call before getting OOB, and gait belt       Length of Stay:  Expected LOS: 4  Actual LOS: 2      Iona Burks LPN

## 2024-04-28 NOTE — DISCHARGE INSTRUCTIONS
Post op Discharge Instructions Hip Fracture     Patient Name: Boo Morelos  Date of admission:  4/26/2024  Date of procedure: [unfilled]   Procedure: Procedure(s):  LEFT HIP CEPHALOMEDULLARY NAIL  Surgeon: Surgeon(s) and Role:     * Maciej Goldman MD - Primary  PCP: @PCP@  Date of discharge: [unfilled]       Follow up appointment with surgeon  See Surgeon(s) and Role:     * Cole Lujan MD - Primary approximately 3-4 weeks from date of surgery.   Call [phone] to make an appointment.    When to call your Orthopaedic Surgeon. If you call after 5pm or on a weekend, the on call physician will be contacted  Pain that is not relieved by pain medication, ice, activity  Signs of infection  Incision is reddened  Incision continues to drain; drainage has an odor  Persistent fever over 101 degrees  Signs of a blood clot in your leg  Calf pain, tenderness, redness and/or swelling of lower leg    When to call your Primary Care Physician  Concerns about medical conditions such as diabetes, high blood pressure, asthma, congestive heart failure  Call if blood sugars are elevated, persistent headache or dizziness, coughing or congestion, constipation or diarrhea, burning with urination, abnormal heart rate (slow or fast)    When to call [phone] and go to the nearest emergency room  Acute onset of chest pain, shortness of breath, difficulty breathing    Activity  Walk with your walker WBAT weight bearing as instructed by your physical therapist. Continue using your walker until seen for follow-up visit.    Practice your exercises 3 times a day as instructed by the physical therapist.  Get up frequently and walk (with assistance as needed)  You may not drive     Incision Care  Keep a dressing on your incision and change daily. Once your incision is not draining, you may leave it open to air.  Wash hands thoroughly before changing the dressing.   You may take a shower when your incision is dry. Do not take a tub bath or go

## 2024-04-29 LAB
BASOPHILS # BLD: 0 K/UL (ref 0–0.1)
BASOPHILS NFR BLD: 0 % (ref 0–1)
DIFFERENTIAL METHOD BLD: ABNORMAL
EOSINOPHIL # BLD: 0 K/UL (ref 0–0.4)
EOSINOPHIL NFR BLD: 0 % (ref 0–7)
ERYTHROCYTE [DISTWIDTH] IN BLOOD BY AUTOMATED COUNT: 14.8 % (ref 11.5–14.5)
HCT VFR BLD AUTO: 22.6 % (ref 36.6–50.3)
HGB BLD-MCNC: 7.5 G/DL (ref 12.1–17)
IMM GRANULOCYTES # BLD AUTO: 0.1 K/UL (ref 0–0.04)
IMM GRANULOCYTES NFR BLD AUTO: 1 % (ref 0–0.5)
LYMPHOCYTES # BLD: 0.9 K/UL (ref 0.8–3.5)
LYMPHOCYTES NFR BLD: 8 % (ref 12–49)
MCH RBC QN AUTO: 31.9 PG (ref 26–34)
MCHC RBC AUTO-ENTMCNC: 33.2 G/DL (ref 30–36.5)
MCV RBC AUTO: 96.2 FL (ref 80–99)
MONOCYTES # BLD: 1.2 K/UL (ref 0–1)
MONOCYTES NFR BLD: 11 % (ref 5–13)
NEUTS SEG # BLD: 8.6 K/UL (ref 1.8–8)
NEUTS SEG NFR BLD: 80 % (ref 32–75)
NRBC # BLD: 0 K/UL (ref 0–0.01)
NRBC BLD-RTO: 0 PER 100 WBC
PLATELET # BLD AUTO: 156 K/UL (ref 150–400)
PMV BLD AUTO: 10.3 FL (ref 8.9–12.9)
RBC # BLD AUTO: 2.35 M/UL (ref 4.1–5.7)
WBC # BLD AUTO: 10.8 K/UL (ref 4.1–11.1)

## 2024-04-29 PROCEDURE — 6370000000 HC RX 637 (ALT 250 FOR IP): Performed by: STUDENT IN AN ORGANIZED HEALTH CARE EDUCATION/TRAINING PROGRAM

## 2024-04-29 PROCEDURE — 36415 COLL VENOUS BLD VENIPUNCTURE: CPT

## 2024-04-29 PROCEDURE — 6370000000 HC RX 637 (ALT 250 FOR IP): Performed by: NURSE PRACTITIONER

## 2024-04-29 PROCEDURE — 1100000000 HC RM PRIVATE

## 2024-04-29 PROCEDURE — 97116 GAIT TRAINING THERAPY: CPT

## 2024-04-29 PROCEDURE — 99024 POSTOP FOLLOW-UP VISIT: CPT | Performed by: PHYSICIAN ASSISTANT

## 2024-04-29 PROCEDURE — 2580000003 HC RX 258: Performed by: STUDENT IN AN ORGANIZED HEALTH CARE EDUCATION/TRAINING PROGRAM

## 2024-04-29 PROCEDURE — 97161 PT EVAL LOW COMPLEX 20 MIN: CPT

## 2024-04-29 PROCEDURE — 97165 OT EVAL LOW COMPLEX 30 MIN: CPT

## 2024-04-29 PROCEDURE — 85025 COMPLETE CBC W/AUTO DIFF WBC: CPT

## 2024-04-29 PROCEDURE — 6360000002 HC RX W HCPCS: Performed by: STUDENT IN AN ORGANIZED HEALTH CARE EDUCATION/TRAINING PROGRAM

## 2024-04-29 PROCEDURE — 97530 THERAPEUTIC ACTIVITIES: CPT

## 2024-04-29 PROCEDURE — 97535 SELF CARE MNGMENT TRAINING: CPT

## 2024-04-29 RX ADMIN — ESCITALOPRAM OXALATE 20 MG: 10 TABLET ORAL at 10:39

## 2024-04-29 RX ADMIN — ACETAMINOPHEN 650 MG: 325 TABLET ORAL at 13:39

## 2024-04-29 RX ADMIN — Medication 6 MG: at 21:21

## 2024-04-29 RX ADMIN — ACETAMINOPHEN 650 MG: 325 TABLET ORAL at 05:27

## 2024-04-29 RX ADMIN — SENNOSIDES AND DOCUSATE SODIUM 1 TABLET: 8.6; 5 TABLET ORAL at 21:21

## 2024-04-29 RX ADMIN — CARBIDOPA AND LEVODOPA 1 TABLET: 25; 100 TABLET ORAL at 13:38

## 2024-04-29 RX ADMIN — CARBIDOPA AND LEVODOPA 1 TABLET: 25; 100 TABLET ORAL at 21:21

## 2024-04-29 RX ADMIN — POLYETHYLENE GLYCOL 3350 17 G: 17 POWDER, FOR SOLUTION ORAL at 10:28

## 2024-04-29 RX ADMIN — SODIUM CHLORIDE, PRESERVATIVE FREE 10 ML: 5 INJECTION INTRAVENOUS at 21:23

## 2024-04-29 RX ADMIN — CARBIDOPA AND LEVODOPA 1 TABLET: 25; 100 TABLET ORAL at 18:21

## 2024-04-29 RX ADMIN — MEMANTINE HYDROCHLORIDE 10 MG: 10 TABLET ORAL at 10:38

## 2024-04-29 RX ADMIN — ENOXAPARIN SODIUM 40 MG: 100 INJECTION SUBCUTANEOUS at 17:24

## 2024-04-29 RX ADMIN — FINASTERIDE 5 MG: 5 TABLET, FILM COATED ORAL at 21:21

## 2024-04-29 RX ADMIN — SODIUM CHLORIDE, PRESERVATIVE FREE 10 ML: 5 INJECTION INTRAVENOUS at 10:40

## 2024-04-29 RX ADMIN — CARBIDOPA AND LEVODOPA 1 TABLET: 25; 100 TABLET ORAL at 10:39

## 2024-04-29 RX ADMIN — SENNOSIDES AND DOCUSATE SODIUM 1 TABLET: 8.6; 5 TABLET ORAL at 10:39

## 2024-04-29 RX ADMIN — PANTOPRAZOLE SODIUM 40 MG: 40 TABLET, DELAYED RELEASE ORAL at 05:27

## 2024-04-29 RX ADMIN — ACETAMINOPHEN 650 MG: 325 TABLET ORAL at 18:54

## 2024-04-29 RX ADMIN — SODIUM CHLORIDE, PRESERVATIVE FREE 10 ML: 5 INJECTION INTRAVENOUS at 10:41

## 2024-04-29 RX ADMIN — TRAMADOL HYDROCHLORIDE 50 MG: 50 TABLET ORAL at 21:21

## 2024-04-29 RX ADMIN — MEMANTINE HYDROCHLORIDE 10 MG: 10 TABLET ORAL at 21:21

## 2024-04-29 ASSESSMENT — PAIN SCALES - GENERAL
PAINLEVEL_OUTOF10: 1
PAINLEVEL_OUTOF10: 3
PAINLEVEL_OUTOF10: 0
PAINLEVEL_OUTOF10: 0
PAINLEVEL_OUTOF10: 4
PAINLEVEL_OUTOF10: 2

## 2024-04-29 ASSESSMENT — PAIN DESCRIPTION - ORIENTATION: ORIENTATION: LEFT

## 2024-04-29 ASSESSMENT — PAIN DESCRIPTION - DESCRIPTORS: DESCRIPTORS: ACHING

## 2024-04-29 ASSESSMENT — PAIN DESCRIPTION - LOCATION: LOCATION: LEG

## 2024-04-29 NOTE — PROGRESS NOTES
Comprehensive Nutrition Assessment    Type and Reason for Visit:      Nutrition Recommendations/Plan:   Continue bowel regimen.  Ensure Plus HP TID.  Weekly standing scale weights, as able.       Malnutrition Assessment:  Malnutrition Status:  Moderate malnutrition (04/29/24 1154)    Context:  Chronic Illness     Findings of the 6 clinical characteristics of malnutrition:  Energy Intake:  No significant decrease in energy intake  Weight Loss:  No significant weight loss     Body Fat Loss:  Mild body fat loss Orbital, Buccal region   Muscle Mass Loss:  Severe muscle mass loss Temples (temporalis), Clavicles (pectoralis & deltoids), Thigh (quadriceps), Hand (interosseous)  Fluid Accumulation:  No significant fluid accumulation     Strength:  Not Performed       Nutrition Assessment:    Past Medical History:   Diagnosis Date    Cancer (HCC)     Dementia (HCC)     Parkinson disease (HCC)    Pt admitted after losing balance and falling, fracture of L femur. He is s/p L hip cephalomedullary nail.     Nutrition review completed for pt d/t low body weight/BMI. Majority of information obtained from wife at bedside - pt interactive, but sleepy. Wife reports decline in weight over the last several months, she attributes to Parkinson's diagnosis. They have been able to find some high protein shakes and foods for him at home he does well with (high protein CIB and waffles), but generally is a pretty limited eater. She expressed frustration with not knowing how to request meals - provided menu and instruction. Added Ensure to meal trays. We reviewed potentially downgrading texture of diet d/t report of needing small bites of food; however, it would eliminate options for things that he likes (such as sandwiches), so decided to keep on Regular diet and wife to assist with cutting up foods, as she has been - she voiced understanding of this.     Weight 135# in June 2023, now 128#.    Nutritionally Significant  Weight, Biochemical Data    Discharge Planning:    Continue Oral Nutrition Supplement     Shanon Carson, JESSICA  Available via [employer] or ext [phone]

## 2024-04-29 NOTE — PLAN OF CARE
Problem: Physical Therapy - Adult  Goal: By Discharge: Performs mobility at highest level of function for planned discharge setting.  See evaluation for individualized goals.  Description: FUNCTIONAL STATUS PRIOR TO ADMISSION: Patient was independent and active without use of DME. Had just completed extensive OPPT and was progressing to working with . Fall hx.     HOME SUPPORT PRIOR TO ADMISSION: The patient lived with wife who assisted with some ADLs and supervised mobility.    Physical Therapy Goals  Initiated 4/29/2024  1.  Patient will move from supine to sit and sit to supine in bed with minimal assistance within 7 day(s).    2.  Patient will perform sit to stand with minimal assistance within 7 day(s).  3.  Patient will transfer from bed to chair and chair to bed with minimal assistance using the least restrictive device within 7 day(s).  4.  Patient will ambulate with minimal assistance for 25 feet with the least restrictive device within 7 day(s).   Patient has no stairs in home thus no stair goal.       Outcome: Progressing     PHYSICAL THERAPY EVALUATION    Patient: Boo Morelos (73 y.o. male)  Date: 4/29/2024  Primary Diagnosis: Fall, initial encounter [W19.XXXA]  Closed fracture of left femur, initial encounter ([facility]) [S72.92XA]  Closed comminuted intra-articular fracture of distal end of left femur, initial encounter ([facility]) [S72.492A]  Closed left hip fracture, initial encounter ([facility]) [S72.002A]  Procedure(s) (LRB):  LEFT HIP CEPHALOMEDULLARY NAIL (Left) 2 Days Post-Op   Precautions: Restrictions/Precautions: Other (comment) (WBAT)                      ASSESSMENT :   DEFICITS/IMPAIRMENTS:   The patient is limited by decreased functional mobility, independence in ADLs, ROM, strength, body mechanics, activity tolerance, endurance, safety awareness, cognition, command following, attention/concentration, coordination, balance, proprioception, increased pain levels. Pt with PMH of cancer,  be of the following complexity level: Low

## 2024-04-29 NOTE — CARE COORDINATION
GERONIMO:  Pt being recommended for IPR. CM met with Pt and wife about IPR; they would like IPR referrals sent, choices are 1) [facility] and 2) [facility]. CM confirmed IPR referral with Reg[facility] and sent referral in [facility].     Patient accepted at [facility], a room will be held for 4/30 pending patient's readiness for d/c.     Pt and wife report that they are new to the area and had HH in [place] but not locally. If denied for IPR, they do NOT want SNF but rather will d/c home with HH and personal care up to 24/7.     Transition of Care Plan:    RUR: 16%  Prior Level of Functioning: snf with wife  Disposition: SNF pending  If SNF or IPR: Date FOC offered: 4/29  Date FOC received: 4/29  Accepting facility: referral sent to [facility]  Date authorization started with reference number:   Date authorization received and expires:   Follow up appointments: IPR  DME needed: none if IPR  Transportation at discharge:   [facility] Medicare/[employer] letter given: y  Is patient a  and connected with [facility]?    If yes, was  transfer form completed and [facility] notified?   Caregiver Contact: [phone] Susan Morelos wife  Discharge Caregiver contacted prior to discharge? y  Care Conference needed?   Barriers to discharge:  placement

## 2024-04-29 NOTE — PROGRESS NOTES
ORTHO PROGRESS NOTE    2024  Admit Date:   2024    Post Op day: 2 Days Post-Op    Subjective:    Boo Morelos states that he feels relatively well overall this morning.  Some pain overnight which was relieved with tramadol.  Has been on scheduled Tylenol as well.  No PT yesterday due to an order discrepancy.  Wife states patient relatively immobile at baseline despite Parkinson's.  Is still hoping to take home with 24-hour care.  Tolerating diet  Denies N/V/SOB or CP    PT/OT:   Gait:                    Vital Signs:    No data found.  Temp (24hrs), Av.5 °F (36.4 °C), Min:97.5 °F (36.4 °C), Max:97.5 °F (36.4 °C)      Pain Control:        Meds:    Current Facility-Administered Medications   Medication Dose Route Frequency    polyethylene glycol (GLYCOLAX) packet 17 g  17 g Oral Daily    sodium chloride flush 0.9 % injection 5-40 mL  5-40 mL IntraVENous 2 times per day    sodium chloride flush 0.9 % injection 5-40 mL  5-40 mL IntraVENous PRN    0.9 % sodium chloride infusion   IntraVENous PRN    ondansetron (ZOFRAN-ODT) disintegrating tablet 4 mg  4 mg Oral Q8H PRN    Or    ondansetron (ZOFRAN) injection 4 mg  4 mg IntraVENous Q6H PRN    sennosides-docusate sodium (SENOKOT-S) 8.6-50 MG tablet 1 tablet  1 tablet Oral BID    enoxaparin (LOVENOX) injection 40 mg  40 mg SubCUTAneous Q24H    acetaminophen (TYLENOL) tablet 650 mg  650 mg Oral 4 times per day    rivastigmine (EXELON) 13.3 MG/24HR 1 patch - PATIENT SUPPLIED MEDICATION (Patient Supplied)  1 patch TransDERmal Q24H    carbidopa-levodopa (SINEMET)  MG per tablet 1 tablet  1 tablet Oral 4x Daily    escitalopram (LEXAPRO) tablet 20 mg  20 mg Oral Daily    finasteride (PROSCAR) tablet 5 mg  5 mg Oral Nightly    melatonin tablet 6 mg  6 mg Oral Nightly    memantine (NAMENDA) tablet 10 mg  10 mg Oral BID    pantoprazole (PROTONIX) tablet 40 mg  40 mg Oral QAM AC    sodium chloride flush 0.9 % injection 5-40 mL  5-40 mL IntraVENous 2 times per

## 2024-04-29 NOTE — PROGRESS NOTES
Orders received, chart reviewed and patient evaluated by physical therapy. Pending progression with skilled acute physical therapy, recommend:  Therapy 3 hours/day 5-7 days/week    Recommend with nursing use of bedpan for toileting. Pt heavy 2-3 person transfer with heavy cueing bed<>chair with RW.    Full evaluation to follow.       Jovani Zamudio, PT

## 2024-04-29 NOTE — PLAN OF CARE
clothing? [x]  1 []  2 []  3 []  4   2.  Bathing (including washing, rinsing, drying)? []  1 [x]  2 []  3 []  4   3.  Toileting, which includes using toilet, bedpan or urinal? [x] 1 []  2 []  3 []  4   4.  Putting on and taking off regular upper body clothing? []  1 [x]  2 []  3 []  4   5.  Taking care of personal grooming such as brushing teeth? []  1 [x]  2 []  3 []  4   6.  Eating meals? []  1 []  2 [x]  3 []  4   © 2007, Trustees of [facility], under license to [employer]. All rights reserved     Score: 11/24     Interpretation of Tool:  Represents clinically-significant functional categories (i.e. Activities of daily living).    Cutoff score 39.4 (19) correlates to a good likelihood of discharging home versus a facility  Radha Anguiano, Susana López, Emanuel Garcia, Brigitte Contreras, Alvaro Cardona, Denis Anguiano, [employer]P[facility] “6-Clicks” Functional Assessment Scores Predict Acute Care Hospital Discharge Destination, Physical Therapy, Volume 94, Issue 9, 1 September 2014, Pages 9466-1780, [email or web address]://[email or web address]    Pain Rating:  Unable to rate, grimacing noticed with transitional mobility      Pain Intervention(s):   nursing notified, ice, rest, and repositioning    Activity Tolerance:   Fair     After treatment:   Patient left in no apparent distress sitting up in chair, Call bell within reach, Bed/ chair alarm activated, and Caregiver / family present    COMMUNICATION/EDUCATION:   The patient's plan of care was discussed with: physical therapist, registered nurse, and     Patient Education  Education Given To: Patient;Family  Education Provided: Role of Therapy;Plan of Care;Transfer Training;Fall Prevention Strategies  Education Method: Demonstration;Verbal  Barriers to Learning: None  Education Outcome: Continued education needed (wife with good understanding)    Thank you for this referral.  Jodi Hayward OT  Minutes: 46    Occupational Therapy Evaluation Charge

## 2024-04-29 NOTE — PROGRESS NOTES
Hospitalist Progress Note  CARI Govea NP  Answering service: [phone]        Date of Service:  2024  NAME:  Boo Morelos  :  1950  MRN:  [id number]    Admission Summary:     Mr. Morelos is a 73 y.o. male with history of cancer, dementia and Parkinsons disease presented to [place] [facility] ED after a fall.  He was walking, lost balance and fell landing on his left leg.  Patient hit his head and has an abrasion but denied loss of consciousness and was at his baseline mental status baseline per wife.  Patient has a history of balance problems related to his Parkinson's.  X-ray showed a comminuted intra-articular fracture of the left femur and he was transferred to [facility] for further evaluation, treatment and surgical needs.     Per wife, he was being treated for UTI with Bactrim and had 3 doses left on his prescription.  Cognitively, he is usually alert and oriented to himself and family.  He does have some sundowning tendencies and has increased confusion at times.     Medication reconciliation completed with wife at bedside on admit  Neurologist: Dr. Jacobo at [facility]  PCP Luis Pettit     Interval history / Subjective:        Patient was seen and examined this morning, he was sitting up in bed in no acute distress, cooperative and interactive with exam.  He was interactive and smiling-he has no current reports of pain or discomfort.  Patient's wife, Susan was at bedside, updated her on the plan of care and he will be working with physical therapy early this morning.    Assessment & Plan:      Closed fracture of the Left Femur  - XR comminuted intra-articular fracture of left femur  - POD 2 left hip cephalomedullary nail  - DVT prophylaxis as per orthopedics recommendations: lovenox for now  - Scheduled Tylenol postoperatively. Tramadol for moderate-severe pain  - PT/OT     Mild Leukocytosis  -

## 2024-04-29 NOTE — PLAN OF CARE
Requires cues for all  Sequencing: Requires cues for all    Functional Mobility Training:  Bed Mobility:  Bed Mobility Training  Bed Mobility Training: Yes  Supine to Sit:  (NT, started  up in chair)  Sit to Supine: Maximum assistance;Assist X2;Additional time  Scooting: Maximum assistance  Transfers:  Transfer Training  Transfer Training: Yes  Overall Level of Assistance: Maximum assistance;Assist X2  Sit to Stand: Maximum assistance;Assist X2;Additional time;Adaptive equipment  Stand to Sit: Maximum assistance;Assist X2;Additional time;Adaptive equipment  Bed to Chair: Maximum assistance;Assist X2;Additional time;Adaptive equipment  Balance:  Balance  Sitting: Impaired  Sitting - Static: Fair (occasional)  Sitting - Dynamic: Poor (constant support)  Standing: Impaired  Standing - Static: Constant support;Fair  Standing - Dynamic: Constant support;Poor   Ambulation/Gait Training:     Gait  Gait Training: Yes  Left Side Weight Bearing: As tolerated  Right Side Weight Bearing: Full  Distance (ft): 3 Feet (chair>bed)  Assistive Device: Walker, rolling;Gait belt  Base of Support: Center of gravity altered;Shift to right;Narrowed  Speed/Jade: Pace decreased (< 100 feet/min);Delayed;Fluctuations  Step Length: Right shortened;Left shortened  Stance: Weight shift;Time;Left decreased  Gait Abnormalities: Antalgic;Decreased step clearance;Festinating gait;Step to gait;Shuffling gait;Path deviations    Pain Rating:  Pt did not quantify pain during session    Pain Intervention(s):   patient medicated for pain prior to session, ice, elevation, repositioning, and pain is at a level acceptable to the patient    Activity Tolerance:   Fair , tolerates ADLS without rest breaks, and SpO2 stable on room air    After treatment:   Patient left in no apparent distress in bed, Call bell within reach, Bed/ chair alarm activated, Caregiver / family present, Side rails x3, Heels elevated for pressure relief, and Updated patient's board on

## 2024-04-30 VITALS
WEIGHT: 128.53 LBS | TEMPERATURE: 98.6 F | DIASTOLIC BLOOD PRESSURE: 84 MMHG | HEART RATE: 87 BPM | RESPIRATION RATE: 16 BRPM | OXYGEN SATURATION: 95 % | HEIGHT: 74 IN | SYSTOLIC BLOOD PRESSURE: 155 MMHG | BODY MASS INDEX: 16.5 KG/M2

## 2024-04-30 PROBLEM — S72.142A CLOSED DISPLACED INTERTROCHANTERIC FRACTURE OF LEFT FEMUR (HCC): Status: RESOLVED | Noted: 2024-04-27 | Resolved: 2024-04-30

## 2024-04-30 PROBLEM — S72.92XA CLOSED FRACTURE OF LEFT FEMUR, INITIAL ENCOUNTER (HCC): Status: RESOLVED | Noted: 2024-04-26 | Resolved: 2024-04-30

## 2024-04-30 PROBLEM — S72.002A CLOSED LEFT HIP FRACTURE, INITIAL ENCOUNTER (HCC): Status: RESOLVED | Noted: 2024-04-26 | Resolved: 2024-04-30

## 2024-04-30 LAB
HCT VFR BLD AUTO: 22.2 % (ref 36.6–50.3)
HGB BLD-MCNC: 7.6 G/DL (ref 12.1–17)

## 2024-04-30 PROCEDURE — 6370000000 HC RX 637 (ALT 250 FOR IP): Performed by: STUDENT IN AN ORGANIZED HEALTH CARE EDUCATION/TRAINING PROGRAM

## 2024-04-30 PROCEDURE — 36415 COLL VENOUS BLD VENIPUNCTURE: CPT

## 2024-04-30 PROCEDURE — 85018 HEMOGLOBIN: CPT

## 2024-04-30 PROCEDURE — 99024 POSTOP FOLLOW-UP VISIT: CPT | Performed by: PHYSICIAN ASSISTANT

## 2024-04-30 PROCEDURE — 6370000000 HC RX 637 (ALT 250 FOR IP): Performed by: NURSE PRACTITIONER

## 2024-04-30 PROCEDURE — 85014 HEMATOCRIT: CPT

## 2024-04-30 PROCEDURE — 2580000003 HC RX 258: Performed by: STUDENT IN AN ORGANIZED HEALTH CARE EDUCATION/TRAINING PROGRAM

## 2024-04-30 PROCEDURE — 6360000002 HC RX W HCPCS: Performed by: STUDENT IN AN ORGANIZED HEALTH CARE EDUCATION/TRAINING PROGRAM

## 2024-04-30 RX ORDER — TRAMADOL HYDROCHLORIDE 50 MG/1
50 TABLET ORAL EVERY 8 HOURS PRN
Qty: 10 TABLET | Refills: 0 | Status: SHIPPED | OUTPATIENT
Start: 2024-04-30 | End: 2024-05-03

## 2024-04-30 RX ORDER — SENNA AND DOCUSATE SODIUM 50; 8.6 MG/1; MG/1
1 TABLET, FILM COATED ORAL 2 TIMES DAILY
Qty: 12 TABLET | Refills: 0 | Status: SHIPPED
Start: 2024-04-30

## 2024-04-30 RX ORDER — POLYETHYLENE GLYCOL 3350 17 G/17G
17 POWDER, FOR SOLUTION ORAL DAILY
Qty: 17 G | Refills: 1 | Status: SHIPPED
Start: 2024-04-30 | End: 2024-05-30

## 2024-04-30 RX ORDER — ENOXAPARIN SODIUM 100 MG/ML
40 INJECTION SUBCUTANEOUS EVERY 24 HOURS
Qty: 27 ML | Refills: 0 | Status: SHIPPED
Start: 2024-04-30 | End: 2024-05-27

## 2024-04-30 RX ADMIN — CARBIDOPA AND LEVODOPA 1 TABLET: 25; 100 TABLET ORAL at 09:21

## 2024-04-30 RX ADMIN — ACETAMINOPHEN 650 MG: 325 TABLET ORAL at 09:28

## 2024-04-30 RX ADMIN — TRAMADOL HYDROCHLORIDE 50 MG: 50 TABLET ORAL at 12:20

## 2024-04-30 RX ADMIN — ENOXAPARIN SODIUM 40 MG: 100 INJECTION SUBCUTANEOUS at 16:14

## 2024-04-30 RX ADMIN — PANTOPRAZOLE SODIUM 40 MG: 40 TABLET, DELAYED RELEASE ORAL at 09:28

## 2024-04-30 RX ADMIN — MEMANTINE HYDROCHLORIDE 10 MG: 10 TABLET ORAL at 09:21

## 2024-04-30 RX ADMIN — CARBIDOPA AND LEVODOPA 1 TABLET: 25; 100 TABLET ORAL at 15:48

## 2024-04-30 RX ADMIN — SENNOSIDES AND DOCUSATE SODIUM 1 TABLET: 8.6; 5 TABLET ORAL at 09:21

## 2024-04-30 RX ADMIN — SODIUM CHLORIDE, PRESERVATIVE FREE 10 ML: 5 INJECTION INTRAVENOUS at 09:30

## 2024-04-30 RX ADMIN — ACETAMINOPHEN 650 MG: 325 TABLET ORAL at 15:43

## 2024-04-30 RX ADMIN — CARBIDOPA AND LEVODOPA 1 TABLET: 25; 100 TABLET ORAL at 12:19

## 2024-04-30 RX ADMIN — ESCITALOPRAM OXALATE 20 MG: 10 TABLET ORAL at 09:21

## 2024-04-30 RX ADMIN — POLYETHYLENE GLYCOL 3350 17 G: 17 POWDER, FOR SOLUTION ORAL at 09:21

## 2024-04-30 NOTE — PROGRESS NOTES
TRANSFER - OUT REPORT:    Verbal report given to GANESH Sinha on Boo Morelos  being transferred to [facility] Room 3118 for routine progression of patient care       Report consisted of patient's Situation, Background, Assessment and   Recommendations(SBAR).     Information from the following report(s) Nurse Handoff Report and Adult Overview was reviewed with the receiving nurse.           Lines:       Opportunity for questions and clarification was provided.      Patient transported with:  [facility] ([facility])

## 2024-04-30 NOTE — PROGRESS NOTES
ORTHO PROGRESS NOTE    2024  Admit Date:   2024    Post Op day: 3 Days Post-Op    Subjective:    Boo Morelos states that he feels somewhat better today.  Wife states that he was able to sleep overnight after having some tramadol prior to bedtime.  Was able to mobilize to chair to sit upright for couple of hours yesterday.  Has been accepted at [facility] for further rehab.  No new orthopedic complaints.  Tolerating diet  Denies N/V/SOB or CP    PT/OT:   Gait:                    Vital Signs:    Patient Vitals for the past 8 hrs:   BP Pulse Resp SpO2   24 0811 (!) 155/84 87 18 95 %     Temp (24hrs), Av.2 °F (36.8 °C), Min:98.1 °F (36.7 °C), Max:98.2 °F (36.8 °C)      Pain Control:        Meds:    Current Facility-Administered Medications   Medication Dose Route Frequency    polyethylene glycol (GLYCOLAX) packet 17 g  17 g Oral Daily    sodium chloride flush 0.9 % injection 5-40 mL  5-40 mL IntraVENous 2 times per day    sodium chloride flush 0.9 % injection 5-40 mL  5-40 mL IntraVENous PRN    0.9 % sodium chloride infusion   IntraVENous PRN    ondansetron (ZOFRAN-ODT) disintegrating tablet 4 mg  4 mg Oral Q8H PRN    Or    ondansetron (ZOFRAN) injection 4 mg  4 mg IntraVENous Q6H PRN    sennosides-docusate sodium (SENOKOT-S) 8.6-50 MG tablet 1 tablet  1 tablet Oral BID    enoxaparin (LOVENOX) injection 40 mg  40 mg SubCUTAneous Q24H    acetaminophen (TYLENOL) tablet 650 mg  650 mg Oral 4 times per day    rivastigmine (EXELON) 13.3 MG/24HR 1 patch - PATIENT SUPPLIED MEDICATION (Patient Supplied)  1 patch TransDERmal Q24H    carbidopa-levodopa (SINEMET)  MG per tablet 1 tablet  1 tablet Oral 4x Daily    escitalopram (LEXAPRO) tablet 20 mg  20 mg Oral Daily    finasteride (PROSCAR) tablet 5 mg  5 mg Oral Nightly    melatonin tablet 6 mg  6 mg Oral Nightly    memantine (NAMENDA) tablet 10 mg  10 mg Oral BID    pantoprazole (PROTONIX) tablet 40 mg  40 mg Oral QAM AC    sodium chloride

## 2024-04-30 NOTE — CARE COORDINATION
GERONIMO:  Going to [facility]  Rm 3118  Report to [phone]   16:00 Stretcher transport, Hospital to Home    Transition of Care Plan to SNF/Rehab    Communication to Patient/Family:  Met with patient and family and they are agreeable to the transition plan. The Plan for Transition of Care is related to the following treatment goals: rehab    The Patient and/or patient representative was provided with a choice of provider and agrees  with the discharge plan.      Yes [x] No []    A Freedom of choice list was provided with basic dialogue that supports the patient's individualized plan of care/goals and shares the quality data associated with the providers.       Yes [x] No []    SNF/Rehab Transition:  Patient has been accepted to [facility] and meets criteria for admission.   Patient will transported by [facility] and expected to leave at 1600    Communication to SNF/Rehab:  Bedside RNPablo has been notified to update the transition plan to the facility and call report (phone number).  Discharge information has been updated on the AVS. And communicated to facility via [employer], or CC link.       Nursing Please include all hard scripts for controlled substances, med rec and dc summary, and AVS in packet.     Reviewed and confirmed with facility [facility] can manage the patient care needs for the following:     Alexandre with (X) only those applicable:  Medication:  [x]Medications are available at the facility  []IV Antibiotics    []Controlled Substance - hard copies available sent.  []Weekly Labs    Equipment:  []CPAP/BiPAP  []Wound Vacuum  []Guajardo or Urinary Device  []PICC/Central Line  []Nebulizer  []Ventilator    Treatment:  []Isolation (for MRSA, VRE, etc.)  []Surgical Drain Management  []Tracheostomy Care  []Dressing Changes  []Dialysis with transportation  []PEG Care  []Oxygen  []Daily Weights for Heart Failure    Dietary:  []Any diet limitations  []Tube Feedings   []Total Parenteral

## 2024-04-30 NOTE — PLAN OF CARE
Problem: Skin/Tissue Integrity  Goal: Absence of new skin breakdown  Description: 1.  Monitor for areas of redness and/or skin breakdown  2.  Assess vascular access sites hourly  3.  Every 4-6 hours minimum:  Change oxygen saturation probe site  4.  Every 4-6 hours:  If on nasal continuous positive airway pressure, respiratory therapy assess nares and determine need for appliance change or resting period.  Outcome: Completed     Problem: Discharge Planning  Goal: Discharge to home or other facility with appropriate resources  Outcome: Completed  Flowsheets (Taken 4/30/2024 1142 by Sia Cooper, RN)  Discharge to home or other facility with appropriate resources: Identify discharge learning needs (meds, wound care, etc)     Problem: Safety - Adult  Goal: Free from fall injury  Outcome: Adequate for Discharge     Problem: Nutrition Deficit:  Goal: Optimize nutritional status  Outcome: Adequate for Discharge

## 2024-04-30 NOTE — DISCHARGE SUMMARY
Discharge Summary       PATIENT ID: Boo Morelos  MRN: [id number]   YOB: 1950    DATE OF ADMISSION: 4/26/2024 10:28 AM    DATE OF DISCHARGE: 4/30/2024  PRIMARY CARE PROVIDER: Luis Pettit DO     ATTENDING PHYSICIAN: Dr. Garcia  DISCHARGING PROVIDER: CARI Swift NP    To contact this individual call [phone] and ask the  to page.  If unavailable ask to be transferred the Adult Hospitalist Department.    CONSULTATIONS: IP CONSULT TO HOSPITALIST    PROCEDURES/SURGERIES: Procedure(s):  LEFT HIP CEPHALOMEDULLARY NAIL    ADMITTING DIAGNOSES & HOSPITAL COURSE:   Mr. Morelos is a 73 y.o. male with history of cancer, dementia and Parkinsons disease presented to [place] ED after a fall.  He was walking, lost balance and fell landing on his left leg.  Patient hit his head and has an abrasion but denied loss of consciousness and was at his baseline mental status baseline per wife.  Patient has a history of balance problems related to his Parkinson's.  X-ray showed a comminuted intra-articular fracture of the left femur and he was transferred to [facility] for further evaluation, treatment and surgical needs.     Per wife, he was being treated for UTI with Bactrim and had 3 doses left on his prescription.  Cognitively, he is usually alert and oriented to himself and family.  He does have some sundowning tendencies and has increased confusion at times.     Medication reconciliation completed with wife at bedside on admit  Neurologist: Dr. Jacobo at [facility]  PCP Luis Pettit    4/30/2024  Medically stable for d/c to SNF  Lovenox for 30 days post op  FU with ortho in 3 weeks  LLE WBAT      DISCHARGE DIAGNOSES / PLAN:      Closed fracture of the Left Femur  - XR comminuted intra-articular fracture of left femur  - DVT prophylaxis as per orthopedics recommendations: lovenox (until May 27th)  - Scheduled Tylenol postoperatively. Tramadol for moderate-severe pain  - PT/OT  Status post  LOVENOX  Inject 0.4 mLs into the skin every 24 hours for 27 days     polyethylene glycol 17 g packet  Commonly known as: GLYCOLAX  Take 1 packet by mouth daily     sennosides-docusate sodium 8.6-50 MG tablet  Commonly known as: SENOKOT-S  Take 1 tablet by mouth 2 times daily     traMADol 50 MG tablet  Commonly known as: ULTRAM  Take 1 tablet by mouth every 8 hours as needed for Pain for up to 3 days. Max Daily Amount: 150 mg            CONTINUE taking these medications      carbidopa-levodopa  MG per tablet  Commonly known as: SINEMET     escitalopram 20 MG tablet  Commonly known as: LEXAPRO     FINASTERIDE PO     melatonin 3 MG Tabs tablet     memantine 10 MG tablet  Commonly known as: NAMENDA     pantoprazole 40 MG tablet  Commonly known as: PROTONIX     PSYLLIUM PO     rivastigmine 13.3 MG/24HR  Commonly known as: EXELON            STOP taking these medications      sulfamethoxazole-trimethoprim 800-160 MG per tablet  Commonly known as: BACTRIM DS;SEPTRA DS               Where to Get Your Medications        Information about where to get these medications is not yet available    Ask your nurse or doctor about these medications  enoxaparin 100 MG/ML  polyethylene glycol 17 g packet  sennosides-docusate sodium 8.6-50 MG tablet  traMADol 50 MG tablet           NOTIFY YOUR PHYSICIAN FOR ANY OF THE FOLLOWING:   Fever over 101 degrees for 24 hours.   Chest pain, shortness of breath, fever, chills, nausea, vomiting, diarrhea, change in mentation, falling, weakness, bleeding. Severe pain or pain not relieved by medications.  Or, any other signs or symptoms that you may have questions about.    DISPOSITION:    Home With:   OT  PT  HH  RN      x Inpatient Rehab    Independent/assisted living    Hospice    Other:       PATIENT CONDITION AT DISCHARGE:     Functional status   x Poor    x Deconditioned     Independent      Cognition    x Lucid     Forgetful    x Dementia      Catheters/lines (plus indication)    Guajardo

## 2024-05-04 NOTE — PROGRESS NOTES
Physician Progress Note      PATIENT:               JYOTI JOLLY  [facility] #:                  [id number]  :                       1950  ADMIT DATE:       2024 10:28 AM  DISCH DATE:        2024 4:30 PM  RESPONDING  PROVIDER #:        Felipa Valencia NP          QUERY TEXT:    Pt admitted with Closed fracture of the Left Femur. Noted anemia in IM PN on   . If possible, please document in progress notes and discharge summary   further specificity regarding the acuity and type of anemia:[[Anemia due to   postoperative blood loss::This patient has anemia due to postoperative blood   loss.]    The medical record reflects the following:  Risk Factors: Closed fracture of the Left Femur  Clinical Indicators: IM PN on  DS on   Anemia  Ortho PN on  Acute postoperative blood loss  HGB-8.8,7.5,7.6  HCT-26.1,22.6,22.2  Estimated Blood Loss: 150 cc  Treatment: Serial Lab, Left Hip Cephalomedullary Nail      Thank You,  Radha Allred S, CDS.  Options provided:  -- Respond - Create new note now  -- Disagree - Not applicable / Not valid  -- Disagree - Clinically unable to determine / Unknown  -- Refer to Clinical Documentation Reviewer    PROVIDER RESPONSE TEXT:    Post op blood loss    Query created by: Radha Allred on 2024 2:16 AM      QUERY TEXT:    Patient admitted with Closed displaced intertrochanteric fracture of left   femur. Noted to have RD note on . If possible, please document in progress   notes and discharge summary if you are evaluating and /or treating any of the   following:    The medical record reflects the following:  Risk Factors: Age 73,male,Dementia.  Clinical Indicators: RD note on  Malnutrition Status:  Moderate   malnutrition.  Context:  Chronic Illness  Findings of the 6 clinical characteristics of malnutrition:  Energy Intake:  No significant decrease in energy intake  Weight Loss:  No significant weight loss  Body Fat Loss:  Mild body fat loss Orbital, Buccal

## 2024-06-23 PROBLEM — A41.9 SEVERE SEPSIS (HCC): Status: ACTIVE | Noted: 2024-01-01

## 2024-06-23 PROBLEM — R65.20 SEVERE SEPSIS (HCC): Status: ACTIVE | Noted: 2024-01-01

## 2024-06-23 NOTE — H&P
Component Value Units Date/Time    Blood Culture 2 [[id number]] Collected: 06/23/24 1209    Order Status: Completed Specimen: Blood Updated: 06/23/24 1235     Special Requests --        NO SPECIAL REQUESTS  RIGHT  FOREARM       Culture NO GROWTH <24 HRS       COVID-19 & Influenza Combo [[id number]] Collected: 06/23/24 1209    Order Status: Completed Specimen: Nasopharyngeal Updated: 06/23/24 1257     SARS-CoV-2, PCR Not detected        Comment: Not Detected results do not preclude SARS-CoV-2 infection and should not be used as the sole basis for patient management decisions.Results must be combined with clinical observations, patient history, and epidemiological information.        Rapid Influenza A By PCR Not detected        Rapid Influenza B By PCR Not detected        Comment: Testing was performed using zeferino Mayte SARS-CoV-2 and Influenza A/B nucleic acid assay.  This test is a multiplex Real-Time Reverse Transcriptase Polymerase Chain Reaction (RT-PCR) based in vitro diagnostic test intended for the qualitative detection of nucleic acids from SARS-CoV-2, Influenza A, and Influenza B in nasopharyngeal and nasal swab specimens for use under the FDA's Emergency Use Authorization (EUA) only.     Fact sheet for Patients: https://www.fda.gov/media/408462/download  Fact sheet for Healthcare Providers: https://www.fda.fov/media/005893/download         Blood Culture 1 [[id number]] Collected: 06/23/24 1200    Order Status: Completed Specimen: Blood Updated: 06/23/24 1236     Special Requests --        NO SPECIAL REQUESTS  LEFT  FOREARM       Culture NO GROWTH <24 HRS                IMAGING:   XR CHEST PORTABLE   Final Result      Left lower lobe moderate pneumonia. Right lower lobe mild pneumonia. Aspiration   is possible. Emphysema. No pneumothorax. Recommend followup PA and lateral chest   views in 8-10 weeks to ensure resolution.            Electronically signed by Lincoln Flores           ECG/ECHO:    Encounter Date:

## 2024-06-23 NOTE — ED PROVIDER NOTES
[facility] EMERGENCY DEP  EMERGENCY DEPARTMENT ENCOUNTER      Pt Name: Boo Morelos  MRN: [id number]  Birthdate 1950  Date of evaluation: 6/23/2024  Provider: Joann Pugh MD    CHIEF COMPLAINT       Chief Complaint   Patient presents with    Aspiration         HISTORY OF PRESENT ILLNESS    Boo Morelos is a 74 yo M with h/o parkinsons diease and Dementia who had an episode of choking followed by shortness of breath after drinking a protein shake this morning.  EMS were called and when they arrived they noted hypoxia which required 10L supplemental oxygen but they were able to wean to 4L during transport.  He is not normally on oxygen.  EMS reports that patient does not speak at baseline but is able to communicate with family who are coming to the ED.   His wife states that he has a sacral wound that today started draining fluids.           Additional history from independent historians:     Review of External Medical Records:     Nursing Notes were reviewed.    REVIEW OF SYSTEMS       Review of Systems   Respiratory:  Positive for shortness of breath.        Except as noted above the remainder of the review of systems was reviewed and negative.       PAST MEDICAL HISTORY     Past Medical History:   Diagnosis Date    Cancer (HCC)     Dementia (HCC)     Parkinson disease (HCC)          SURGICAL HISTORY       Past Surgical History:   Procedure Laterality Date    FEMUR SURGERY Left 4/27/2024    LEFT HIP CEPHALOMEDULLARY NAIL performed by Maciej Goldman MD at [facility] MAIN OR    TURP           CURRENT MEDICATIONS       Previous Medications    CARBIDOPA-LEVODOPA (SINEMET)  MG PER TABLET    Take 1 tablet by mouth 4 times daily    ESCITALOPRAM (LEXAPRO) 20 MG TABLET    Take 1 tablet by mouth daily    FINASTERIDE PO    Take 5 mg by mouth daily Takes it at night    MELATONIN 3 MG TABS TABLET    Take 2 tablets by mouth daily    MEMANTINE (NAMENDA) 10 MG TABLET    Take 1 tablet by mouth 2 times daily    PANTOPRAZOLE

## 2024-06-23 NOTE — ED TRIAGE NOTES
Patient arrived via EMS from [facility]. Per report patient had possible aspiration then period of unconsciousness. On EMS arrival patient SPO2 70-80s started on 15NRB. Now on 4L NC SPO2 @ 98%  Hx Dementia and parkinsons. DNR

## 2024-06-24 PROBLEM — B96.20 BACTEREMIA DUE TO ESCHERICHIA COLI: Status: ACTIVE | Noted: 2024-01-01

## 2024-06-24 PROBLEM — A49.8 INFECTION DUE TO ESBL-PRODUCING ESCHERICHIA COLI: Status: ACTIVE | Noted: 2024-01-01

## 2024-06-24 PROBLEM — R78.81 BACTEREMIA DUE TO ESCHERICHIA COLI: Status: ACTIVE | Noted: 2024-01-01

## 2024-06-24 PROBLEM — J69.0 ASPIRATION PNEUMONIA OF LEFT LOWER LOBE DUE TO REGURGITATED FOOD (HCC): Status: ACTIVE | Noted: 2024-01-01

## 2024-06-24 PROBLEM — Z16.12 INFECTION DUE TO ESBL-PRODUCING ESCHERICHIA COLI: Status: ACTIVE | Noted: 2024-01-01

## 2024-06-24 NOTE — WOUND CARE
WOCN Note:     New consult for sacral wound     Boo Morelos is a 73 y.o. y/o male who presented for SHERI (acute kidney injury) (HCC) [N17.9]  Acute respiratory failure with hypoxia (HCC) [J96.01]  Wound of sacral region, initial encounter [S31.000A]  Severe sepsis (HCC) [A41.9, R65.20]  Pneumonia of both lower lobes due to infectious organism [J18.9]  Sepsis with acute organ dysfunction without septic shock, due to unspecified organism, unspecified organ dysfunction type (HCC) [A41.9, R65.20]  Admitted on 6/23/2024    Past Medical History:   Diagnosis Date    Cancer (HCC)     Dementia (HCC)     Parkinson disease (HCC)        Lab Results   Component Value Date/Time    WBC 12.4 (H) 06/24/2024 03:07 AM    LABA1C 4.6 06/23/2024 05:18 PM    POCGLU 95 06/24/2024 08:14 AM    POCGLU 122 (H) 06/23/2024 09:35 PM    HGB 12.9 06/24/2024 03:07 AM    HCT 40.2 06/24/2024 03:07 AM     06/24/2024 03:07 AM        Tobacco Use      Smoking status: Never      Smokeless tobacco: Never     ADULT DIET; Full Liquid     Infectious Disease following    Assessment:   Seen today in room 411/01   Keeps eyes open and does not communicate but responds with body movements when being turned  Total assist in repositioning for visual assessment of sacrum  Turned onto left side.    has an external urinary collection device connected to suction.    Surface: BYRON mattress  Bilateral heels intact and without erythema.  Heels offloaded with pillows.    1. POA Pressure Injury vs Trauma Wound to Right Lateral Malleolus  1 x 1 x <0.1 cm  100% pink with flat margins  Scant serous exudate   Periwound intact & without erythema    Tx: covered with foam dressing      2. POA Unstageable Pressure Injury to Sacrum     5 x 2.2 x 2 cm - majority of external wound is leathery slough with opening toward the anus/ inferior aspect of wound.   3.5 cm tunnel towards anus  Moderate puru-sanguinous malodorous

## 2024-06-24 NOTE — CARE COORDINATION
Care Management Initial Assessment       RUR: 19%  Readmission? No  1st IM letter given? Yes - 6/24/24  1st  letter given: No    CM spoke with pt spouse via phone to verify demographics and insurance info. Pt lives at [facility] independent living. Spouse, Susan, states that pt needed assistance with ADLs and ambulations prior to admission, but made significant progress after [facility]. Susan's goal is for pt to return to [facility]. Susan was a geriatric social walker, and has emphasized, that pt will NOT be going to SNF. CM verbalized understanding. Pt has hx of [facility]SY and is open with [facility] for PT/OT/SNF, and has personal private aides from them as well (M-F 8 hours a day / Sat-Sun 9am-1pm). Pt DME: WC, walker, bedside commode. Prior to [facility], was WC bound, and after [facility], was able to use walker with supervision. PCP verified. Pharm - [facility] on [address] [facility]. CM pending PT/OT recs. Referral will be sent pending MD approval. CM will continue to follow.      06/24/24 0681   Service Assessment   Patient Orientation Alert and Oriented   Cognition Alert   History Provided By Patient;Spouse   Primary Caregiver Spouse   Support Systems Spouse/Significant Other;Family Members;Children   Patient's Healthcare Decision Maker is: Named in Scanned ACP Document   PCP Verified by CM Yes   Last Visit to PCP Within last 3 months   Prior Functional Level Assistance with the following:;Bathing;Dressing;Toileting;Feeding;Cooking;Housework;Shopping;Mobility   Current Functional Level Assistance with the following:;Bathing;Dressing;Toileting;Feeding;Cooking;Housework;Shopping;Mobility   Can patient return to prior living arrangement Unknown at present   Ability to make needs known: Good   Family able to assist with home care needs: Yes   Would you like for me to discuss the discharge plan with any other family members/significant others, and if so, who? Yes   Financial Resources Medicare   CM/SW Referral ADLs/IADLs   Social/Functional History

## 2024-06-25 PROBLEM — R53.81 DEBILITY: Status: ACTIVE | Noted: 2024-01-01

## 2024-06-25 PROBLEM — Z71.89 ADVANCE CARE PLANNING: Status: ACTIVE | Noted: 2024-01-01

## 2024-06-25 PROBLEM — F03.90 DEMENTIA WITHOUT BEHAVIORAL DISTURBANCE, PSYCHOTIC DISTURBANCE, MOOD DISTURBANCE, OR ANXIETY (HCC): Status: ACTIVE | Noted: 2024-01-01

## 2024-06-25 PROBLEM — E43 SEVERE MALNUTRITION (HCC): Status: ACTIVE | Noted: 2024-01-01

## 2024-06-25 NOTE — CONSULTS
[facility]  Inpatient Consultation        Admit Date: 6/23/2024  Reason for Consultation: sacral decubitus ulcer    HPI:  Boo Morelos is a 73 y.o. male w/ hx of Parkinsons, dementia, bedbound whom we are asked to see in consultation by Anne Sturgeon, wound care nurse for the above complaint.  Pt was admitted from his facility w/ probable aspiration pneumonia.  He was hypoxic w/ low sats.  He also has an ongoing sacral decub. Unclear how long it has been there as all info gathered for epic.    He is on abx for pneumonia - on merrem    Patient Active Problem List    Diagnosis Date Noted    Severe sepsis (HCC) 06/23/2024     Past Medical History:   Diagnosis Date    Cancer (HCC)     Dementia (HCC)     Parkinson disease (HCC)       Past Surgical History:   Procedure Laterality Date    FEMUR SURGERY Left 4/27/2024    LEFT HIP CEPHALOMEDULLARY NAIL performed by Maciej Goldman MD at [facility] MAIN OR    TURP        Social History     Tobacco Use    Smoking status: Never     Passive exposure: Never    Smokeless tobacco: Never   Substance Use Topics    Alcohol use: Never      No family history on file.   Prior to Admission medications    Medication Sig Start Date End Date Taking? Authorizing Provider   QUEtiapine (SEROQUEL) 25 MG tablet Take 0.5 tablets by mouth nightly   Yes Dmitry Coburn MD   sennosides-docusate sodium (SENOKOT-S) 8.6-50 MG tablet Take 1 tablet by mouth 2 times daily 4/30/24   Minor, CARI Leo - NP   PSYLLIUM PO Take 1 packet by mouth as needed for Constipation    ProviderDmitry MD   melatonin 3 MG TABS tablet Take 2 tablets by mouth daily    Dmitry Coburn MD   carbidopa-levodopa (SINEMET)  MG per tablet Take 1.5 tablets by mouth 4 times daily    Dmitry Coburn MD   FINASTERIDE PO Take 5 mg by mouth daily Takes it at night 4/4/19   Dmitry Coburn MD   pantoprazole (PROTONIX) 40 MG tablet  5/4/23   Dmitry Coburn MD 
53.8 kg (118 lb 9.6 oz)   SpO2 100%   BMI 15.23 kg/m²     Temp (24hrs), Av.8 °F (37.1 °C), Min:97.5 °F (36.4 °C), Max:101.3 °F (38.5 °C)       Lines:  peripheral line    PHYSICAL EXAM:   General:    Alert, no distress, appears stated age.     HEENT: Atraumatic, anicteric sclerae, pink conjunctivae     No oral ulcers, mucosa dry  Neck:  Supple  Lungs:   Clear in apex with decreased breath sounds at bases.  No Wheezing or Rhonchi. No rales.  Chest wall:  No tenderness  No Accessory muscle use.  Heart:   Regular  rhythm,  No  murmur   No edema  Abdomen:   Soft, non-tender. Not distended.  Bowel sounds normal  Extremities: No cyanosis.  No clubbing  Skin:     Not pale.  Not Jaundiced  No rashes     DTI; heels  Psych:  Poor insight.   Not anxious or agitated.  Neurologic: Pupils are reactive to light No facial asymmetry. No aphasia or slurred speech when speak. Alert and oriented X self only       Data Review:     CBC:  Recent Labs     24  1208 24  0307   WBC 12.9* 12.4*   LYMPHS 0.8 0.7*   HGB 14.5 12.9   HCT 45.3 40.2    240       BMP:  Recent Labs     24  1208   BUN 47*   *   K 4.2   *   CO2 25       LFTS:  Recent Labs     24  1208   ALT 12       Microbiology:   [unfilled]    Imaging:   XR CHEST PORTABLE 2024    Narrative  EXAM:  XR CHEST PORTABLE    INDICATION: Aspiration, sepsis. Dementia, Parkinson disease.    COMPARISON: Portable chest on 2024. Chest views on 2023.    TECHNIQUE: Upright portable chest AP view    FINDINGS: Cardiac monitoring wires overlie the thorax. The cardiac silhouette is  within normal limits. The pulmonary vasculature is within normal limits.    Lungs are predominantly lucent. Left lower lobe airspace opacity is new. Right  lung base airspace opacity is subtle and new. No pneumothorax. Bones are  osteopenic.    Impression  Left lower lobe moderate pneumonia. Right lower lobe mild pneumonia. Aspiration  is possible. Emphysema. 
Evaluation:   Behavioral-Environmental Outcomes: None Identified  Food/Nutrient Intake Outcomes: Diet Advancement/Tolerance, Food and Nutrient Intake, Supplement Intake, IVF Intake  Physical Signs/Symptoms Outcomes: Biochemical Data, Chewing or Swallowing, GI Status, Meal Time Behavior, Nutrition Focused Physical Findings, Weight, Skin    Discharge Planning:    Too soon to determine     Recent Labs     06/23/24  1208 06/24/24  0307 06/25/24  0150   GLUCOSE 213* 107* 113*   BUN 47* 44* 40*   CREATININE 1.63* 0.88 0.82   * 153* 152*   K 4.2 3.9 3.5   * 123* 124*   CO2 25 22 26   CALCIUM 9.4 8.6 8.5   MG  --   --  2.4       Recent Labs     06/23/24  1208   ALT 12   AST 12*   ALKPHOS 152*   BILITOT 1.2*   GLOB 4.3*     Recent Labs     06/24/24  0307 06/25/24  0150   WBC 12.4* 13.2*   HGB 12.9 12.9   HCT 40.2 41.1    223       Recent Labs     06/23/24  1908 06/23/24  2135 06/24/24  0814 06/24/24  1158 06/24/24  1717 06/24/24  2123 06/25/24  0808 06/25/24  1225   POCGLU 103 122* 95 95 146* 136* 85 120*       Lab Results   Component Value Date/Time    LABA1C 4.6 06/23/2024 05:18 PM    EAG 85 06/23/2024 05:18 PM       Lexi Bhatti RD  Available via [employer]    
  06/17/23 57.4 kg (126 lb 8.7 oz)   06/08/23 58.8 kg (129 lb 10.1 oz)   06/03/23 61.2 kg (135 lb)        Current Diet: ADULT ORAL NUTRITION SUPPLEMENT; Breakfast, Lunch, Dinner; Standard High Calorie/High Protein Oral Supplement  ADULT ORAL NUTRITION SUPPLEMENT; Breakfast, Lunch, Dinner; Clear Liquid Oral Supplement  ADULT ORAL NUTRITION SUPPLEMENT; Lunch, Dinner, Breakfast; Frozen Oral Supplement  ADULT ORAL NUTRITION SUPPLEMENT; Dinner; Fortified Gelatin Oral Supplement  ADULT DIET; Dysphagia - Pureed       PSYCHOSOCIAL/SPIRITUAL SCREENING:   Palliative IDT has assessed this patient for cultural preferences / practices and a referral made as appropriate to needs (Cultural Services, Patient Advocacy, Ethics, etc.)    Spiritual Affiliation: None    Any spiritual / Congregation concerns:  [] Yes /  [x] No   If \"Yes\" to discuss with pastoral care during IDT     Does caregiver feel burdened by caring for their loved one:   [] Yes /  [x] No /  [] No Caregiver Present/Available [] No Caregiver [] Pt Lives at Facility  If \"Yes\" to discuss with social work during IDT    Anticipatory grief assessment:   [x] Normal  / [] Maladaptive     If \"Maladaptive\" to discuss with social work during IDT    ESAS Anxiety:      ESAS Depression:          LAB AND IMAGING FINDINGS:   Objective data reviewed:  labs, images, records, medication use, vitals, and chart       FINAL COMMENTS   Thank you for allowing Palliative Medicine to participate in the care of Boo Morelos.    Only check if applicable and billing time based rather than MDM  [x] The total encounter time on this service date was __90__ minutes which was spent performing a face-to-face encounter and personally completing the provider-level activities documented in the note. This includes time spent prior to the visit and after the visit in direct care of the patient. This time does not include time spent in any separately reportable services.    Electronically signed by   Jaylene

## 2024-06-25 NOTE — CARE COORDINATION
CM spoke to palliative in regards to discharge disposition for pt. Pt would like to go home with hospice once discharged. Pt family would like referrals sent to [facility] and [facility]. CM will send referrals. Pt family requesting representative at the bedside. CM will continue to follow.

## 2024-06-26 NOTE — CARE COORDINATION
153 - CM spoke with ID in regards to DC plan for pt Iv abx. Pt will not do full treatment for iv abx. Pt plan to go home with [facility] hospice on Monday at 0930. Transport will be set up via [facility] until date is closer to planned DALE. CM will continue to follow.     0927 - CM called [facility] to follow up with referral sent yesterday. Stated they will call family to set up bedside representative to speak to pt and spouse. CM will continue to follow.

## 2024-06-27 NOTE — CARE COORDINATION
CM made aware that pt and pt spouse have chosen [facility] hospice to discharge with. Pt spouse would like to arrange private care givers prior to pt discharge. Plan is to continue IV antibiotics while admitted and discharge on Monday with hospice care- IV antibiotics will be stopped then. CM will continue to follow.

## 2024-06-28 NOTE — CARE COORDINATION
Per MD, pt will discharge Monday 7/1 and be treated until then. Pt will discharge via stretcher at 0930 per pt and pt spouse request. Transport set up ahead of time via [facility] referral through [facility]. Bedside RN notified and MD notified. CM will continue to follow.

## 2024-06-30 PROBLEM — G20.A1 PARKINSON'S DISEASE, UNSPECIFIED WHETHER DYSKINESIA PRESENT, UNSPECIFIED WHETHER MANIFESTATIONS FLUCTUATE (HCC): Status: ACTIVE | Noted: 2024-01-01

## 2024-06-30 NOTE — PROGRESS NOTES
Hospitalist Progress Note  Nikita Suh MD  Answering service: [phone] OR [phone] from in house phone        Date of Service:  2024  NAME:  Boo Morelos  :  1950  MRN:  [id number]      Admission Summary:   Boo Morelos is a 73 y.o. male with a pmhx parkinsons, and dementia who presents from [facility] with transient hypoxia concerning for aspiration, and is being admitted for severe sepsis due to aspiration pneumonia and infected sacral wound.     In the ED, he was febrile to one 1.3, tachycardic to 121.  He was hypoxic to 88% on room air with improvement to 97% on 4 L nasal cannula.  Labs showed WBC 12.9, sodium 147, glucose 213, BUN 47, creatinine 1.63 (b/l 0.99), and lactic 6.8.  Chest x-ray showed left lower lobe moderate pneumonia, and right lower lobe mild pneumonia concerning for aspiration, and emphysema.     In the ED, he received ceftriaxone, and sepsis fluids.    Interval history / Subjective:   Seen and examined for follow up sepsis, bacteremia. No new events overnight.  Discussed with patient's wife at bedside requesting PT to evaluate for lower extremity rigidity awaiting discharge on Monday to home hospice     Assessment & Plan:     severe sepsis  septic shock  aspiration pneumonia  sacral wound  ESBL E. Coli bacteremia   -CXR with bilateral pna  -Blood culture growing probable ESBL E. Coli   -wound care, swallow evaluation, and aspiration precautions  -Meropenem started, vanc discontinued by ID   - Patient's wife and daughter are not interested in surgery at this time due to concerns of wound healing in setting of poor nutrition and limited mobility   - Agree with Hospice consultation   - At this time, will continue IV antibiotics till monday  - Appreciate ID and Palliative care teams      Hypoxia, resolved   - Reportedly transiently hypoxic to the mid 80s with improvement to 90% on 2 L 
                                                                                                Hospitalist Progress Note  Nikita Suh MD  Answering service: [phone] OR [phone] from in house phone        Date of Service:  2024  NAME:  Boo Morelos  :  1950  MRN:  [id number]      Admission Summary:   Boo Morelos is a 73 y.o. male with a pmhx parkinsons, and dementia who presents from [facility] with transient hypoxia concerning for aspiration, and is being admitted for severe sepsis due to aspiration pneumonia and infected sacral wound.     In the ED, he was febrile to one 1.3, tachycardic to 121.  He was hypoxic to 88% on room air with improvement to 97% on 4 L nasal cannula.  Labs showed WBC 12.9, sodium 147, glucose 213, BUN 47, creatinine 1.63 (b/l 0.99), and lactic 6.8.  Chest x-ray showed left lower lobe moderate pneumonia, and right lower lobe mild pneumonia concerning for aspiration, and emphysema.     In the ED, he received ceftriaxone, and sepsis fluids.    Interval history / Subjective:   Seen and examined for follow up sepsis, bacteremia. No new events overnight.  Patient waiting hospice admission for Monday discussed with palliative care team  TILL THAN CONT CURRENT TREATMENT     Assessment & Plan:     severe sepsis  septic shock  aspiration pneumonia  sacral wound  ESBL E. Coli bacteremia   -CXR with bilateral pna  -Blood culture growing probable ESBL E. Coli   -wound care, swallow evaluation, and aspiration precautions  -Meropenem started, vanc discontinued by ID   - Patient's wife and daughter are not interested in surgery at this time due to concerns of wound healing in setting of poor nutrition and limited mobility   - Agree with Hospice consultation   - At this time, will continue IV antibiotics till monday  - Appreciate ID and Palliative care teams      Hypoxia, resolved   - Reportedly transiently hypoxic to the mid 80s with improvement to 90% on 2 L nasal cannula on admission. 
                                                                                                Hospitalist Progress Note  Roshan Guthrie MD  Answering service: [phone] OR [phone] from in house phone        Date of Service:  2024  NAME:  Boo Morelos  :  1950  MRN:  [id number]      Admission Summary:   Boo Morelos is a 73 y.o. male with a pmhx parkinsons, and dementia who presents from [facility] with transient hypoxia concerning for aspiration, and is being admitted for severe sepsis due to aspiration pneumonia and infected sacral wound.     In the ED, he was febrile to one 1.3, tachycardic to 121.  He was hypoxic to 88% on room air with improvement to 97% on 4 L nasal cannula.  Labs showed WBC 12.9, sodium 147, glucose 213, BUN 47, creatinine 1.63 (b/l 0.99), and lactic 6.8.  Chest x-ray showed left lower lobe moderate pneumonia, and right lower lobe mild pneumonia concerning for aspiration, and emphysema.     In the ED, he received ceftriaxone, and sepsis fluids.    Interval history / Subjective:   Seen and examined for follow up sepsis, bacteremia. No new events overnight. Patient reports feeling better.     Assessment & Plan:     severe sepsis  septic shock  aspiration pneumonia  sacral wound  ESBL E. Coli bacteremia   -CXR with bilateral pna  -Blood culture growing probable ESBL E. Coli   -wound care, swallow evaluation, and aspiration precautions  -Meropenem started, vanc discontinued by ID   - Patient's wife and daughter are not interested in surgery at this time due to concerns of wound healing in setting of poor nutrition and limited mobility   - Agree with Hospice consultation   - At this time, will continue IV antibiotics  - Appreciate ID and Palliative care teams      Hypoxia, resolved   - Reportedly transiently hypoxic to the mid 80s with improvement to 90% on 2 L nasal cannula on admission. High suspicion for acute aspiration event that is now resolving.  -Aspiration 
                                                                                                Hospitalist Progress Note  Roshan Guthrie MD  Answering service: [phone] OR [phone] from in house phone        Date of Service:  2024  NAME:  Boo Morelos  :  1950  MRN:  [id number]      Admission Summary:   Boo Morelos is a 73 y.o. male with a pmhx parkinsons, and dementia who presents from [facility] with transient hypoxia concerning for aspiration, and is being admitted for severe sepsis due to aspiration pneumonia and infected sacral wound.     In the ED, he was febrile to one 1.3, tachycardic to 121.  He was hypoxic to 88% on room air with improvement to 97% on 4 L nasal cannula.  Labs showed WBC 12.9, sodium 147, glucose 213, BUN 47, creatinine 1.63 (b/l 0.99), and lactic 6.8.  Chest x-ray showed left lower lobe moderate pneumonia, and right lower lobe mild pneumonia concerning for aspiration, and emphysema.     In the ED, he received ceftriaxone, and sepsis fluids.    Interval history / Subjective:   Seen and examined for follow up sepsis, bacteremia, aspiration PNA. Patient responds to questions. Wife arrived and we discussed his medical history.     Assessment & Plan:     severe sepsis  septic shock  aspiration pneumonia  sacral wound  ESBL E. Coli bacteremia   -CXR with bilateral pna  -Blood culture growing probable ESBL E. Coli   -wound care, swallow evaluation, and aspiration precautions  -Meropenem started overnight, continues on vancomycin   -ID on board      Hypoxia  - Reportedly transiently hypoxic to the mid 80s with improvement to 90% on 2 L nasal cannula.  Her suspicion for acute aspiration event that is now resolving.  -Aspiration precautions  - Speech consulted for swallow evaluation as per above  -Now on RA      Parkinsons  dementia  -supportive care  -continue home medication when safe to take PO  -some confusion      Hyperglycemia  - Glucose 230, no documented history of 
                                                                                                Hospitalist Progress Note  Song Monge MD  Answering service: [phone] OR [phone] from in house phone        Date of Service:  2024  NAME:  Boo Morelos  :  1950  MRN:  [id number]      Admission Summary:   Boo Morelos is a 73 y.o. male with a pmhx parkinsons, and dementia who presents from [facility] with transient hypoxia concerning for aspiration, and is being admitted for severe sepsis due to aspiration pneumonia and infected sacral wound.     In the ED, he was febrile to one 1.3, tachycardic to 121.  He was hypoxic to 88% on room air with improvement to 97% on 4 L nasal cannula.  Labs showed WBC 12.9, sodium 147, glucose 213, BUN 47, creatinine 1.63 (b/l 0.99), and lactic 6.8.  Chest x-ray showed left lower lobe moderate pneumonia, and right lower lobe mild pneumonia concerning for aspiration, and emphysema.     In the ED, he received ceftriaxone, and sepsis fluids.    Interval history / Subjective:   Patient seen and examined along with palliative RN Rain.  Patient's wife present at the bedside.  Patient developed nausea and vomiting yesterday evening and KUB showed gastric distention without small or large bowel obstruction.  He is kept n.p.o. since this morning due to nausea vomiting and gastric distention..  He just got suctioned, looks uncomfortable but not in distress at this time.  Patient is due to be discharged home with home hospice tomorrow so his wife is clear that she would not want further testing, imaging, needlesticks, Accu-Cheks and wanted him to be comfortable and wanted to transition to comfort.  Rain from hospice is working on to see if he can be admitted to the [facility].       Assessment & Plan:     severe sepsis  septic shock  aspiration pneumonia  sacral wound  ESBL E. Coli bacteremia   Nausea and vomiting with gastric distention without small or large bowel obstruction  -CXR 
                                                                                                Hospitalist Progress Note  Song Monge MD  Answering service: [phone] OR [phone] from in house phone        Date of Service:  2024  NAME:  Boo Morelos  :  1950  MRN:  [id number]      Admission Summary:   Boo Morelos is a 73 y.o. male with a pmhx parkinsons, and dementia who presents from [facility] with transient hypoxia concerning for aspiration, and is being admitted for severe sepsis due to aspiration pneumonia and infected sacral wound.     In the ED, he was febrile to one 1.3, tachycardic to 121.  He was hypoxic to 88% on room air with improvement to 97% on 4 L nasal cannula.  Labs showed WBC 12.9, sodium 147, glucose 213, BUN 47, creatinine 1.63 (b/l 0.99), and lactic 6.8.  Chest x-ray showed left lower lobe moderate pneumonia, and right lower lobe mild pneumonia concerning for aspiration, and emphysema.     In the ED, he received ceftriaxone, and sepsis fluids.    Interval history / Subjective:   Patient seen and examined, his wife was present at the bedside.  She is awake but appears confused, he tries to say words but difficult to understand.  His wife requested to avoid blood draws and further IV sticks.  She would not want replacement of IV if he were to lose access.  Otherwise plan is to keep IV antibiotics until his discharge Monday morning.     Assessment & Plan:     severe sepsis  septic shock  aspiration pneumonia  sacral wound  ESBL E. Coli bacteremia   -CXR with bilateral pna  -Blood culture growing probable ESBL E. Coli   -wound care, swallow evaluation, and aspiration precautions  -Meropenem started, vanc discontinued by ID   - Patient's wife and daughter are not interested in surgery at this time due to concerns of wound healing in setting of poor nutrition and limited mobility   - Agree with Hospice consultation   - At this time, will continue IV antibiotics till monday  - 
    Infectious Disease Progress Note     Subjective:      Afebrile, calm    Objective:    Vitals:   Patient Vitals for the past 24 hrs:   Temp Pulse Resp BP SpO2   06/25/24 1000 -- 82 -- -- --   06/25/24 0800 -- 57 12 (!) 155/74 100 %   06/25/24 0600 -- 67 -- -- --   06/25/24 0400 97.9 °F (36.6 °C) 69 14 (!) 142/79 --   06/25/24 0300 97.7 °F (36.5 °C) -- -- (!) 148/96 96 %   06/25/24 0200 -- 77 -- -- --   06/24/24 2354 97.7 °F (36.5 °C) 84 15 (!) 140/87 97 %   06/24/24 2330 98.4 °F (36.9 °C) 79 17 137/86 --   06/24/24 2159 -- 95 -- -- --   06/24/24 2035 98.2 °F (36.8 °C) 96 19 (!) 150/98 --   06/24/24 2000 -- 90 -- -- --   06/24/24 1800 -- 90 -- -- --   06/24/24 1441 99.8 °F (37.7 °C) (!) 102 21 116/79 99 %   06/24/24 1400 -- 88 -- -- --   06/24/24 1200 -- 82 -- -- --       Physical Exam:  Gen: No apparent distress, cachectic  HEENT:  Normocephalic, atraumatic  CV: normal rate    Lungs: no wheezing  Abdomen: soft, non tender, non distended  Genitourinary:  No carpio catheter   Skin: sacral decub ulcer  Psych: occasionally follows commands  Neuro: awake, calm  Musculoskeletal:  atrophic extremities    Central Line:      Medications:    Current Facility-Administered Medications:     dextrose 5 % and 0.45 % sodium chloride infusion, , IntraVENous, Continuous, Lian Blunt MD, Last Rate: 75 mL/hr at 06/25/24 0859, New Bag at 06/25/24 0859    [COMPLETED] meropenem (MERREM) 1,000 mg in sodium chloride (PF) 0.9 % 20 mL IV syringe, 1,000 mg, IntraVENous, Once, 1,000 mg at 06/24/24 0258 **FOLLOWED BY** meropenem (MERREM) 1,000 mg in sodium chloride 0.9 % 100 mL IVPB (mini-bag), 1,000 mg, IntraVENous, Q8H, Roshan Barakat MD, Last Rate: 33.3 mL/hr at 06/25/24 1106, 1,000 mg at 06/25/24 1106    balsum peru-castor oil (VENELEX) ointment, , Topical, BID, Roshan Barakat MD, Given at 06/25/24 0900    collagenase ointment, , Topical, Daily, Roshan Barakat MD, Given at 06/25/24 0900    rivastigmine 
    Infectious Disease Progress Note     Subjective:      Afebrile, calm    Objective:    Vitals:   Patient Vitals for the past 24 hrs:   Temp Pulse Resp BP SpO2   06/26/24 0545 -- 67 -- -- --   06/26/24 0348 98 °F (36.7 °C) 73 19 (!) 153/87 --   06/26/24 0236 98.3 °F (36.8 °C) 84 20 (!) 175/97 100 %   06/26/24 0157 -- 76 -- -- --   06/25/24 2320 98 °F (36.7 °C) 82 17 (!) 153/89 100 %   06/25/24 2158 -- 87 -- -- --   06/25/24 2000 98 °F (36.7 °C) 74 17 134/86 --   06/25/24 1956 -- 74 -- -- --   06/25/24 1855 98.2 °F (36.8 °C) 76 19 120/64 100 %   06/25/24 1800 -- 88 -- -- --   06/25/24 1558 99.4 °F (37.4 °C) 83 16 (!) 156/95 100 %   06/25/24 1200 -- 79 -- -- 100 %   06/25/24 1111 98 °F (36.7 °C) 78 19 122/81 --   06/25/24 1000 -- 82 -- -- --   06/25/24 0800 -- 57 12 (!) 155/74 100 %       Physical Exam:  Gen: No apparent distress, cachectic  HEENT:  Normocephalic, atraumatic  CV: normal rate    Lungs: no wheezing  Abdomen: soft, non tender, non distended  Genitourinary:  No carpio catheter   Skin: sacral decub ulcer  Psych: occasionally follows commands  Neuro: awake, calm  Musculoskeletal:  atrophic extremities    Central Line:      Medications:    Current Facility-Administered Medications:     dextrose 5 % solution, , IntraVENous, Continuous, Roshan Barakat MD    dextrose 5 % and 0.45 % sodium chloride infusion, , IntraVENous, Continuous, Lian Blunt MD, Last Rate: 75 mL/hr at 06/25/24 2114, New Bag at 06/25/24 2114    [COMPLETED] meropenem (MERREM) 1,000 mg in sodium chloride (PF) 0.9 % 20 mL IV syringe, 1,000 mg, IntraVENous, Once, 1,000 mg at 06/24/24 0258 **FOLLOWED BY** meropenem (MERREM) 1,000 mg in sodium chloride 0.9 % 100 mL IVPB (mini-bag), 1,000 mg, IntraVENous, Q8H, Roshan Barakat MD, Stopped at 06/26/24 0651    balsum peru-castor oil (VENELEX) ointment, , Topical, BID, Roshan Barakat MD, Given at 06/25/24 2107    collagenase ointment, , Topical, Daily, Roshan Barakat 
  Palliative Medicine   [place] [phone] (COPE)        [facility] [phone] (COPE)      Palliative Medicine SW met with the patient's spouse for brief psychosocial support- she is appreciative, she shared that their Rabbi from [place] has been in touch and offering emotional/spiritual support to them- even offering to drive down if needed. Susan shares that friends have also reached out- she has placed boundaries (appropriate and encouraged) as far as visitors, disclosing information, etc. For her own emotional well-being. Support and reflective listening provided, patient to discharge Monday with hospice care- continue current care over weekend.     Thank you for including Palliative Medicine in the care of Boo Summers LCSW     
  Palliative Medicine   [place] [phone] (COPE)        [facility] [phone] ([place])      Palliative Medicine CALE and Dr. Wright met with Susan, the patient's spouse to provide support- Susan had some additional questions regarding IV antibiotics and stopping them- we answered to the best of our ability. We also discussed wound care w/ hospice at discharge and premedicating prior to wound care to help with discomfort, etc. Plan is to continue IV antibiotics while admitted and discharge on Monday with hospice care- IV antibiotics will be stopped.     Susan reflects how difficult this decision has been, although she professionally recommended hospice to many patients/families- she discusses how difficult it has been with her own family member. SW provided support and reflective listening- acknowledging how difficult it is to wear \"two hats.\" Family is also appreciative of Music therapy, they are considering hiring her at home for support as well.     Palliative Medicine will continue to follow along with you. Plan to stabilize and discharge with hospice care. Susan does share that the Lovenox Injections are very painful- she is in agreement w/ stopping Lovenox.     Thank you for including Palliative Medicine in the care of Boo Summers LCSW     
  Palliative Medicine   [place] [phone] (COPE)        [facility] [phone] (COPE)    The Palliative Medicine CALE met with the patient's spouse Susan at bedside for psychosocial support, we met outside the room- she shares that she and her children have been processing all the information from this hospitalization. 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 SW provided supportive listening, validating her experience- anticipatory grief noted. She shares that at times she wondered if she was making the right decision, however, after speaking with her PCP with whom they trust as well as further with General Surgery- patient needing intubation for debridement and hearing that the PCP also recommends hospice helped provide comfort and affirmation that she is making the right decision for her .     Susan is meeting with [facility] later today, met with Sylvester huang- she is wanting a list of hospice agencies that go to [facility]/contract with the [facility]- will provide. Supportive presence and listening provided- spouse is appreciative of supportive visit today.     SW will provide list of hospice agencies that contract with [facility] for spouse to review.     Addendum: CALE called the patient's spouse- she received list of hospice agencies that contract with [facility], she shares she had a good meeting w/ [facility] and plans to use this agency at discharge.     She does reflect on the stress of today- had a lot of mixed information/messages, but shares that she is better now- plan is to continue IV antibiotics while inpatient, however, at discharge (anticipated for Monday when care aides can be in place) she will 
1303 Pt was getting ready to transfer to the hospice house. During report to the hospice house, RN spoke to hospice nurse Niesha and was asked to administer pt's PRN Ativan, 4mg of morphine and zofran prior to the pt being transferred. Med were administered, pt's eyes rolled back and pt became less responsive. During this time, code blue was called and then cancelled because pt has an active DNR on chart and started responding.     
Approx 2035 when getting vitals I asked pt if I could get his temperature and he clearly stated, \" not right now\". Pt didn't say anything else but he did nod no when I asked him if he was in pain.  
[place] [phone] (COPE)  [facility] [phone] (COPE)      GOALS OF CARE: DNR, continue current care- stabilize as much as possible and discharge home with hospice care        TREATMENT PREFERENCES:   Code Status: DNR     Advance Care Planning:  [x] [facility] Interdisciplinary Team has updated the ACP Navigator with Health Care Agent and Patient Capacity    Primary Decision Maker (Health Care Agent):   Relationship to patient:  [x] Named in a scanned document   [] Legal Next of Kin  [] Guardian      Family Meeting Documentation    Participants: Dr. Wright, spouse Susan, daughter Sofie, and son Jaswinder \"Rafa\" (via phone- lives in [place])    Psychosocial, Spiritual History: The patient lives at [facility] with his wife Susan, they have been  45 years. They have two children, Sofie and Rafa- Sofie lives locally in [facility], Rafa lives in [place]. The patient has three grandchildren, ages 9, 6, and 4. Rafa has two children and Sofie has one 4-year-old son Braeden. 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 He was also very involved in his [employer] ([employer] [employer]) and rotary club. He is the type of person who would give and give to others, but did not like asking for help himself. He has shared w/ family previously that he feels like he has \"had a good life\" and proud of his children/family, and blessed for the life he has lived.     Discussion: The Palliative Medicine SW and Dr. Wright met with the patient's wife Susan, daughter Sofie, and son Rafa via phone in order to introduce the role of Palliative Medicine and provide added layer of support.     We hear how things have been going prior to admission- patient had noted decline per family s/p femur fx. They shared he was able to make some gains at [facility], however, they have seen him having both physical 
Clinical Pharmacy Note - Extended Infusion Cefepime Dosing    This patient has been ordered cefepime at 2 g IV every 8 hours. P&T protocol allows automatic substitution to extended infusion cefepime and dose adjustment based on indication and renal function (adjustment required if creatinine clearance < 60 mL/min).    Indication: SSTI    CrCl: 30    Per P&T protocol, the cefepime dosing will be adjusted to 2 g IV every 24 hours (each dose will be infused over 4 hours).    Please call pharmacy with any questions.    
Comprehensive Nutrition Assessment    Type and Reason for Visit: Reassess    Nutrition Recommendations/Plan:   Continue pureed diet.  Honor preferences as able.  Continue Ensure Plus HP and Magic Cup with ALL meals  Consider d/c of POC BG checks given goals/ POC       Malnutrition Assessment:  Malnutrition Status:  Severe malnutrition (06/25/24 1128)    Context:  Chronic Illness     Findings of the 6 clinical characteristics of malnutrition:  Energy Intake:  75% or less estimated energy requirements for 1 month or longer  Weight Loss:  Greater than 7.5% over 3 months     Body Fat Loss:  Severe body fat loss Triceps   Muscle Mass Loss:  Severe muscle mass loss Temples (temporalis), Clavicles (pectoralis & deltoids), Hand (interosseous), Thigh (quadriceps)  Fluid Accumulation:  No significant fluid accumulation     Strength:  Not Performed       Nutrition Assessment:    PMHx includes parkinson's, dementia, and NET of pancreas s/p resection 5 years ago.  Presented from [facility].  Pt admitted with severe sepsis, acute respiratory failure with hypoxia, aspiration PNA, sacral wound.      6/28: follow-up.  Noted diet advanced to purees.  Noted palliative/ hospice involvement.  Plan for pt to return home with hospice on Monday (until then, continue current care unless declines prior to Monday in which case would discuss IP hospice).  Spoke with wife.  Reports plan was great, but he does not consistently receive items requested and also still with missing items on tray that are not part of requests (like oatmeal, pureed banana) and there is never any indication of why it is not sent.  He continues to eat the Magic Cup whenever it comes, but does not always receive.  He does not like the Ensure Clear, but actually drinking more of the Ensure Plus HP.  I have put a stock of Magic Cup for him in unit freezer and wife is aware.        6/25: Consult for poor PO intake, noted wounds.  BMI 15.23 kg/m².  Surgery consulted for 
[facility]  Good Help to Those in Need  [phone]     Patient Name: Boo Morelos  YOB: 1950  Age: 73 y.o.    [facility] H[facility] RN Note:  Hospice bedside visit with Dr Monge and wife Susan. Patient had a decline overnight with distended abdomen, N/V, required oral suctioning this morning. Wife agrees to make him comfort care only. Dr Monge to place PRN IV medications.  Wife agrees with GIP admission at [facility] today due to decline. Should he stabilize she would like to take him home with hospice care.  Daina Beal NP aware of plan  CM setting transport for 3pm  [facility] call unit for report: [phone], nurse Esme  IV will be left in place and he will be medicated with IV morphine and zofran prior to transport.   Dr Block aware of plan: CTI is Parkinson's  Discharge order placed    Thank you for the opportunity to be of service to this patient.     Rain Lucero RN  Clinical Nurse Liaison  [facility]  [phone] Mobile  [phone] Office   Available on Perfect Serve     
General Surgery Progress Note    Sacral ulcer  Date:2024       Room:[facility]  Patient Name:Boo Morelos     YOB: 1950     Age:73 y.o.    Subjective     No acute surgical issues.  Pt is sitting in bed watching TV with wife at bedside    Medications   Scheduled Meds:    meropenem  1,000 mg IntraVENous Q8H    balsum peru-castor oil   Topical BID    collagenase   Topical Daily    rivastigmine  1 patch TransDERmal Daily    acetaminophen  650 mg Oral TID    QUEtiapine  12.5 mg Oral Q24H    aspirin  81 mg Oral Daily    memantine  10 mg Oral BID    escitalopram  20 mg Oral Daily    pantoprazole  40 mg Oral QAM AC    enoxaparin  40 mg SubCUTAneous Daily    sodium chloride flush  5-40 mL IntraVENous 2 times per day    finasteride  5 mg Oral QHS    insulin lispro  0-4 Units SubCUTAneous TID WC    insulin lispro  0-4 Units SubCUTAneous Nightly    carbidopa-levodopa  1.5 tablet Oral 4x Daily     Continuous Infusions:    dextrose 5 % and 0.45 % NaCl 75 mL/hr at 24 0859    sodium chloride      sodium chloride      dextrose       PRN Meds: ipratropium 0.5 mg-albuterol 2.5 mg, sodium chloride, potassium chloride **OR** potassium alternative oral replacement **OR** potassium chloride, magnesium sulfate, ondansetron **OR** ondansetron, polyethylene glycol, acetaminophen **OR** acetaminophen, sodium chloride flush, sodium chloride, glucose, dextrose bolus **OR** dextrose bolus, glucagon (rDNA), dextrose        Physical Examination      Vitals:  BP (!) 156/95   Pulse 83   Temp 99.4 °F (37.4 °C) (Axillary)   Resp 16   Ht 1.88 m (6' 2\")   Wt 53.8 kg (118 lb 9.6 oz)   SpO2 100%   BMI 15.23 kg/m²   Temp (24hrs), Av.2 °F (36.8 °C), Min:97.7 °F (36.5 °C), Max:99.4 °F (37.4 °C)      Physical Exam:    Gen:  No apparent distress  Neuro:  Alert   Pulm:  Unlabored  Sacral wound exam deferred     I/O (24Hr):    Intake/Output Summary (Last 24 hours) at 2024 1739  Last data filed at 2024 1601  Gross per 24 
[facility]  Good Help to Those in Need  [phone]     Patient Name: Boo Morelos  YOB: 1950  Age: 73 y.o.    [facility] Hospice RN Note:  Hospice liaison met with wife. Consents signed and scanned, DDNR on chart for MD signature: Dr Suh aware  Transport for 9:30 am 7/1  Admission: 7/1 11 am  Meds: SRX will be filled at [facility] today and wife will   DME: DME express # [id number]: to be delivered 7/1-8-11am: gel overlay, high back reclining WC with cushion  CTI: Dr Martinez for Parkinson's     Thank you for the opportunity to be of service to this patient.     Rain Lucero RN  Clinical Nurse Liaison  [facility]  [phone] Mobile  [phone] Office   Available on Perfect Serve     
Music Therapy Assessment  [facility]    Boo Morelos [id number]     1950  73 y.o.  male    Patient Telephone Number: [phone] (home)   Buddhist Affiliation: None   Language: English   Patient Active Problem List    Diagnosis Date Noted    Dementia without behavioral disturbance, psychotic disturbance, mood disturbance, or anxiety ([facility]) 06/25/2024    Severe malnutrition ([facility]) 06/25/2024    Debility 06/25/2024    Advance care planning 06/25/2024    Infection due to ESBL-producing Escherichia coli 06/24/2024    Bacteremia due to Escherichia coli 06/24/2024    Aspiration pneumonia of left lower lobe due to regurgitated food ([facility]) 06/24/2024    Sepsis with acute organ dysfunction without septic shock ([facility]) 06/23/2024        Date: 6/27/2024            Total Time Calculated: 75 min          [facility] 4 IMCU    Mental Status:   [x] Alert [  ] Forgetful [  ]  Confused  [  ] Minimally responsive  [  ] Sleeping    Communication Status: [  ] Impaired Speech [  ] Nonverbal -N/A    Physical Status:   [  ] Oxygen in use  [  ] Hard of Hearing [  ] Vision Impaired  [  ] Ambulatory  [  ] Ambulatory with assistance [  ] Non-ambulatory -N/A    Music Preferences, Background: Per pt's spouse, pt enjoys 60s/70s Folk artists; Pt enjoys musicals like My Fair Lady & Les Jodi; Pt likes artists like The Temptations, The Four Tops, and reggae music.     Clinical Problem addressed: Enhance communication, positive social interaction, increased engagement     Goal(s) met in session:   Physical/Pain management (Scale of 1-10):    Pre-session rating: N/A  Post-session rating: N/A  [  ] Increased relaxation   [  ] Affected breathing patterns  [  ] Decreased muscle tension   [  ] Decreased agitation  [  ] Affected heart rate    [  ] Increased alertness     Emotional/Psychological:  [x] Increased self-expression   [  ] Decreased aggressive behavior   [  ] Decreased feelings of stress  [  ] Discussed healthy coping skills     [x] Improved 
Music Therapy Assessment  [facility]    Boo Morelos [id number]     1950  73 y.o.  male    Patient Telephone Number: [phone] (home)   Pentecostalism Affiliation: None   Language: English   Patient Active Problem List    Diagnosis Date Noted    Dementia without behavioral disturbance, psychotic disturbance, mood disturbance, or anxiety ([facility]) 06/25/2024    Severe malnutrition ([facility]) 06/25/2024    Debility 06/25/2024    Advance care planning 06/25/2024    Infection due to ESBL-producing Escherichia coli 06/24/2024    Bacteremia due to Escherichia coli 06/24/2024    Aspiration pneumonia of left lower lobe due to regurgitated food ([facility]) 06/24/2024    Sepsis with acute organ dysfunction without septic shock ([facility]) 06/23/2024        Date: 6/26/2024            Total Time Calculated: 27 min          [facility] 4 IMCU    Mental Status:   [x] Alert [  ] Forgetful [  ]  Confused  [  ] Minimally responsive  [  ] Sleeping    Communication Status: [  ] Impaired Speech [  ] Nonverbal -N/A    Physical Status:   [  ] Oxygen in use  [  ] Hard of Hearing [  ] Vision Impaired  [  ] Ambulatory  [  ] Ambulatory with assistance [  ] Non-ambulatory -N/A    Music Preferences, Background: per pt's spouse, pt enjoys 60's/70's Folk singers; The Beatles, The Temptations, The Four Tops; Pt and his spouse utilize music for additional encouragement and opportunities for engagement     Clinical Problem addressed: Engagement; Comfort    Goal(s) met in session: N/A: Please see Session Observations below.   Physical/Pain management (Scale of 1-10):    Pre-session rating: N/A  Post-session rating: N/A  [  ] Increased relaxation   [  ] Affected breathing patterns  [  ] Decreased muscle tension   [  ] Decreased agitation  [  ] Affected heart rate    [  ] Increased alertness     Emotional/Psychological:  [x] Increased self-expression   [  ] Decreased aggressive behavior   [  ] Decreased feelings of stress  [  ] Discussed healthy coping skills     [  ] 
[facility]  Good Help to Those in Need  [phone]     Patient Name: Boo Morelos  YOB: 1950  Age: 73 y.o.    [facility] Hospice RN Note:  Hospice liaison met with wife briefly and plan is confirmed for 11 am home admission on Monday.  We will meet tomorrow to complete consents and DME will be set up for delivery before Monday.     Thank you for the opportunity to be of service to this patient.     Rain Lucero RN  Clinical Nurse Liaison  [facility]  [phone] Mobile  [phone] Office   Available on Perfect Serve     
Occupational Therapy  6/26/2024    Chart reviewed and up to date, noted patient & family met with palliative care, goal is to transition home with hospice at this time. Will sign off but please re-consult OT services if change in status/POC occurs.     Thank you,  Karoline Otero, OTD, OTR/L  
Palliative Medicine  Patient Name: Boo Morelos  YOB: 1950  MRN: [id number]  Age: 73 y.o.  Gender: male    Date of Initial Consult: 6/25/24  Date of Service: 6/27/2024  Time: 10:55 AM  Provider: Jaylene Wright MD  Hospital Day: 5  Admit Date: 6/23/2024  Referring Provider: Dr Blunt       Reasons for Consultation:  Goals of Care    HISTORY OF PRESENT ILLNESS (HPI):   Boo Morelos \"Joo\" is a 73 y.o. male with a past medical history of Parkinsonism,  dementia , neuroendocrine tumor of pancreas s/p resection 5 years ago, who was admitted on 6/23/2024 from home at [facility] with hypoxia- found to have severe sepsis due to aspiration PNA and sacral wound. Bcx on admission + ESBL e coli. ID managing abx. Surgery would like treatment of aspiration PNA before taking to OR.     Pt and wife moved from [place] to [facility] here in [place] 1/2024. Was hospitalized at [facility] 4/26-4/30/24 after loss of balance and fall- L femur fx, s/p IM nail. At that time went to [facility]. Had MBS there w/out any restrictions. SLP has eval here and given medical and anatomical conditions- at risk for chronic prandial aspiration. ID here has talked w/ family about realistic expectations - pt would require good nutrition, surgical debridement in order to have any improvement in sacral wound.     Psychosocial: Lives w/ Susan, wife of 45 years at [facility] independent living. Made improvement at [facility] and was able to walk w/ supervision but mostly in w/c. Dtr [facility]- she has a 3 yo son Braeden. Son Rafa in [place]- he has a 6 and 8 yo.   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 Very involved in Taoism and rotary club. Loved helping others, but did not like having to ask for help himself.     PALLIATIVE DIAGNOSES:    Dementia, Parkinsonism sx   Severe malnutrition   PNA and aspiration risk   Sacral wound   Debility   Palliative care encounter/goals of care 
Pharmacist Note - Vancomycin Dosing    Consult provided for this 73 y.o. male for indication of Sacral wound with purulent drainage .  Antibiotic regimen(s): vancomycin, cefepime  Patient on vancomycin PTA? NO     Recent Labs     24  1208   WBC 12.9*   CREATININE 1.63*   BUN 47*     Frequency of BMP: daily though   Height: 188 cm  Weight: 52.1 kg  Est CrCl: 30 ml/min; UO: -- ml/kg/hr  Temp (24hrs), Av.3 °F (37.9 °C), Min:99.2 °F (37.3 °C), Max:101.3 °F (38.5 °C)    Cultures:   blood, pending    MRSA Swab ordered (if applicable)? N/A    The plan below is expected to result in a target range of AUC/PRICILA 400-600    Therapy will be initiated with a loading dose of 1250 mg IV x 1.  Given elevated Scr from baseline (~1); will order loading dose.  Will trend scr over next 24 hours to determine if maintenance dose can be started vs. dosing by random levels. Pharmacy to follow patient daily and order levels / make dose adjustments as appropriate.      *Vancomycin has been dosed used Bayesian kinetics software to target an AUC/PRICILA of 400-600, which provides adequate exposure for an assumed infection due to MRSA with an PRICILA of 1 or less while reducing the risk of nephrotoxicity as seen with traditional trough based dosing goals.     
Physical Therapy  6/26/2024    Chart reviewed and up to date, noted patient & family met with palliative care, goal is to transition home with hospice at this time. Will sign off but please re-consult PT services if change in status/POC occurs.     Thank you,  Florence Shirley, PT, DPT  
Physical Therapy 6/29/2024    Orders acknowledged and chart reviewed up to date. Noted plans for discharge on 7/1 with hospice care. Acute PT will sign off but please re-consult PT services if change in status/POC occurs.     Thank you,  Tori Tavarez, PT, DPT, NCS    
Radiology at pt's bedside for portable KUB.  
Referral source:   Boo Morelos at [facility] in [facility] 4 IM.  attended rounds in the IMCU as part of the Interdisciplinary team where the patient's ongoing care was discussed. I reviewed the medical record as part of this encounter.     Outcome: Interdisciplinary team are aware of  availability and were encouraged to request support as needed.      Advised nurse to contact Spiritual Care for any further referrals.The  on-call can be reached at [phone]).     Rev. Erika Allen MDiv, [facility]  Staff     
Renal Dosing/Monitoring  Medication: Meropenem   Current regimen:  1 gram IV every 12 hr  Recent Labs     06/23/24  1208 06/24/24  0307   CREATININE 1.63* 0.88   BUN 47* 44*     Estimated CrCl:  57 ml/min  Plan: Change to 1 gram IV every 8 hours per [facility] P&T Committee Protocol with respect to renal function.  Pharmacy will continue to monitor patient daily and will make dosage adjustments based upon changing renal function.      
Responded to a CODE BLUE overhead.  Patient is DNR and going to the hospice house, transport at bedside.  Apparently RN was administering Ativan and morphine and patient became less responsive.  RN stated that she spoke with the hospice house who asked her to medicate him for the ride to the facility so administer 2 mg of Ativan and 4 mg morphine IV following which patient rolled his eyes and became less responsive, never lost pulse.  He is now responsive, his vitals are good. Wife at the bedside.  He will be transported to the hospice house to continue comfort/hospice care.  
SLP Contact Note    Noted pt and pt's wife pursuing more comfort measures. Will sign off but please reach out to this writer with any support needed.      Tabby Goss M.Ed, PhD(c), CCC-SLP  Speech-Language Pathologist    
SLP Contact Note  Noted excellent note by SLP yesterday, Janel Araujo regarding this patient. Pt's wife reported that he had just had MBS at [facility]. Was able to locate the Radiology report which showed the following:    MBS 5/6/24 [facility]:  Degenerative changes are seen in the cervical spine with mild anterolisthesis of C4 on C5. Partial osseous fusion of C5-C6, as delineated on recent CT.     There is incomplete epiglottic inversion.     With thin liquid consistency barium, there is premature spillage to the vallecula and piriform sinuses. There is laryngeal penetration, ultimately with trace aspiration. Vallecular and piriform sinus residue noted.     With puree consistency barium, there is premature spillage to the hypopharynx. There is no significant penetration or aspiration. Vallecular piriform sinus residue noted.     With nectar consistency barium, there is delayed initiation of swallowing. There maybe trace new penetration without definite new aspiration. Vallecular residue again noted.     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 There is impression on the posterior aspect of the cervical esophagus related to cervical spinal osteophytosis.     Patient's wife reports that there were no consistency modifications required. Unable to locate the SLP note. However, patient's wife states that she is not interested in repeat imaging at this time and that her goal is to keep the patient happy and comfortable. Given patient's Parkinson's Disease as well as cervical spine changes and prominent cricopharyngeus, patient is at chronic risk for prandial aspiration. It appears that patient's wife may be willing to accept risks of aspiration/penetration as long as the patient is happy and comfortable.     The chest imaging could certainly be indicative of aspiration although bilateral lobe involvement is not always associated with a prandial aspiration. However, given pharyngeal residue present pt is certainly at 
Spiritual Care Assessment/Progress Note  [facility]    Name: Boo Morelos MRN: [id number]    Age: 73 y.o.     Sex: male   Language: English     Date: 6/27/2024            Total Time Calculated: 15 min              Spiritual Assessment begun in [facility]  Service Provided For: Patient not available  Referral/Consult From: Palliative Care  Encounter Overview/Reason: Palliative Care    Spiritual beliefs:      [] Involved in a leanna tradition/spiritual practice:      [] Supported by a leanna community:      [] Claims no spiritual orientation:      [] Seeking spiritual identity:           [] Adheres to an individual form of spirituality:      [x] Not able to assess:                Identified resources for coping and support system:   Support System: Spouse, Family members, Palliative Care       [] Prayer                  [] Devotional reading               [] Music                  [] Guided Imagery     [] Pet visits                                        [] Other: (COMMENT)     Specific area/focus of visit   Encounter:    Crisis:    Spiritual/Emotional needs:    Ritual, Rites and Sacraments:    Grief, Loss, and Adjustments:    Ethics/Mediation:    Behavioral Health:    Palliative Care: Type: Palliative Care, Initial/Spiritual Assessment  Advance Care Planning:      I attempted to visit Boo Morelos for a palliative initial spiritual assessment. Patient's chart was consulted. Unable to complete assessment at this time, as patient was sleeping and did not awake. No family was present.  Chaplain Natalie, Olga, MS, BCC  
[facility]  Good Help to Those in Need  [phone]    Patient Name: Boo Morelos  YOB: 1950  Age: 73 y.o.    [facility] Hospice RN Note:  Hospice consult noted. Chart reviewed. Plan of care discussed with patients nurse & care manager.   In to meet with wife, Susan  Discussed Hospice philosophy, general plan of care, levels of care, services and on call procedures.  Family information packet provided & reviewed with wife.  She would like him to go home Monday morning when her aides will be able to start care. She has chosen [facility]. We will plan for home admission 11 am on Monday.  She has all DME she needs except wheelchair which we will have delivered before Monday.  Hospice will follow while here and plan for home admission.    Thank you for the opportunity to be of service to this patient.     Rain Lucero RN  Clinical Nurse Liaison  [facility]  [phone] Mobile  [phone] Office   Available on Perfect Serve     
[facility]  Good Help to Those in Need  [phone]     Patient Name: Boo Morelos  YOB: 1950  Age: 73 y.o.    [facility] Hospice RN Note:  Hospice consult received, reviewing chart. Will follow up with Unit Nurse and Care Manager to discuss plan of care, patient status and discharge disposition within the hour.   Patient's wife is requesting hospice meeting tomorrow at 1pm.      Thank you for the opportunity to be of service to this patient.     Rain Lucero RN  Clinical Nurse Liaison  [facility]  [phone] Mobile  [phone] Office   Available on Perfect Serve     
tablet by mouth daily   ketoconazole (NIZORAL) 2 % shampoo Past Week    Sig: Apply 1 Application topically daily as needed for Itching Apply topically daily as needed.   memantine (NAMENDA) 10 MG tablet Past Week    Sig: Take 1 tablet by mouth 2 times daily   pantoprazole (PROTONIX) 40 MG tablet Past Week    Sig: Take 1 tablet by mouth daily   rivastigmine (EXELON) 13.3 MG/24HR Past Week    Sig: Exelon Patch 13.3 mg/24 hour transdermal   Apply 1 patch every day by transdermal route for 90 days.      Facility-Administered Medications: None     Please contact the main inpatient pharmacy with any questions or concerns at [phone] and we will direct you to the clinical pharmacist covering this patient's care while in-house.   Glenys Dunbar [facility]    
breath    Clinical Pain Assessment (nonverbal scale for severity on nonverbal patients):   Clinical Pain Assessment  Severity: 0       NVPS:  Adult Nonverbal Pain Scale (NVPS)  Face: No particular expression or smile  Activity (Movement): Laid quietly, normal position  Guarding: Lying quietly, no positioning of hands over areas of bod  Physiology (Vital Signs): Stable vital signs  Respiratory: Baseline RR/SpO2 compliant with ventilator  NVPS Score : 0    RDOS:         Vital Signs: Blood pressure (!) 145/81, pulse 68, temperature 99 °F (37.2 °C), temperature source Axillary, resp. rate 18, height 1.88 m (6' 2\"), weight 53.8 kg (118 lb 9.6 oz), SpO2 100 %.    PHYSICAL ASSESSMENT:   General: [] Oriented x3  [] Well appearing  [] Intubated  []Ill appearing  [x]Other: calm, says a few words to me, smiles.   Mental Status: [] Normal mental status exam  [x] Drowsy  [] Confused  []Other:  Cardiovascular: [] Regular rate/rhythm  [] Arrhythmia  [] Other:  Chest: [x] Effort normal  []Lungs clear  [] Respiratory distress  []Tachypnea  [] Other:  Abdomen: [] Soft/non-tender  [] Normal appearance  [] Distended  [] Ascites  [] Other:  Neurological: [] Normal speech  [] Normal sensation  []Deficits present:  Extremity: [] Normal skin color/temp  [] Clubbing/cyanosis  [] No edema  [] Other:    Wt Readings from Last 15 Encounters:   06/24/24 53.8 kg (118 lb 9.6 oz)   04/26/24 58.3 kg (128 lb 8.5 oz)   12/25/23 60.8 kg (134 lb 0.6 oz)   06/17/23 57.4 kg (126 lb 8.7 oz)   06/08/23 58.8 kg (129 lb 10.1 oz)   06/03/23 61.2 kg (135 lb)        Current Diet: ADULT ORAL NUTRITION SUPPLEMENT; Breakfast, Lunch, Dinner; Standard High Calorie/High Protein Oral Supplement  ADULT ORAL NUTRITION SUPPLEMENT; Breakfast, Lunch, Dinner; Clear Liquid Oral Supplement  ADULT ORAL NUTRITION SUPPLEMENT; Lunch, Dinner, Breakfast; Frozen Oral Supplement  ADULT ORAL NUTRITION SUPPLEMENT; Dinner; Fortified Gelatin Oral Supplement  ADULT DIET; Dysphagia - 
this patient and independently examined them on 6/25/2024 as outlined below:          General : awake, no acute distress, limited conversation but appropriate, thin and chronically ill appearing, resting comfortably in bed   HEENT: PEERL, EOMI, moist mucus membrane  Neck: supple, no JVD, no meningeal signs  Chest: Clear to auscultation bilaterally   CVS: S1 S2 heard, Capillary refill less than 2 seconds  Abd: soft/ non tender, non distended, BS physiological,   Ext: no clubbing, no cyanosis, no edema, brisk 2+ DP pulses  Neuro/Psych: pleasant mood and affect, CN 2-12 grossly intact, sensory grossly within normal limit  Skin: warm            Data Review:    Review and/or order of clinical lab test  Review and/or order of tests in the radiology section of CPT  Review and/or order of tests in the medicine section of CPT    I have independently reviewed and interpreted patient's lab and all other diagnostic data    Notes reviewed from all clinical/nonclinical/nursing services involved in patient's clinical care. Care coordination discussions were held with appropriate clinical/nonclinical/ nursing providers based on care coordination needs.     Labs:     Recent Labs     06/24/24  0307 06/25/24  0150   WBC 12.4* 13.2*   HGB 12.9 12.9   HCT 40.2 41.1    223     Recent Labs     06/23/24  1208 06/24/24  0307 06/25/24  0150   * 153* 152*   K 4.2 3.9 3.5   * 123* 124*   CO2 25 22 26   BUN 47* 44* 40*   MG  --   --  2.4     Recent Labs     06/23/24  1208   ALT 12   GLOB 4.3*     No results for input(s): \"INR\", \"APTT\" in the last 72 hours.    Invalid input(s): \"PTP\"   No results for input(s): \"TIBC\" in the last 72 hours.    Invalid input(s): \"FE\", \"PSAT\", \"FERR\"   No results found for: \"RBCF\"   No results for input(s): \"PH\", \"PCO2\", \"PO2\" in the last 72 hours.  No results for input(s): \"CPK\" in the last 72 hours.    Invalid input(s): \"CPKMB\", \"CKNDX\", \"TROIQ\"  No results found for: \"CHOL\", \"CHLST\", \"CHOLV\",

## 2024-06-30 NOTE — PLAN OF CARE
Problem: Physical Therapy - Adult  Goal: By Discharge: Performs mobility at highest level of function for planned discharge setting.  See evaluation for individualized goals.  Description: FUNCTIONAL STATUS PRIOR TO ADMISSION: assisted by wife and caregivers; ambulating with HHPT    HOME SUPPORT PRIOR TO ADMISSION: wife, daily caregivers 2x/day    Physical Therapy Goals  Initiated 6/24/2024  1.  Patient will move from supine to sit and sit to supine in bed with moderate assistance within 7 day(s).    2.  Patient will perform sit to stand with moderate assistance within 7 day(s).  3.  Patient will transfer from bed to chair and chair to bed with moderate assistance using the least restrictive device within 7 day(s).  4.  Patient will ambulate with moderate assistance for 50 feet with the least restrictive device within 7 day(s).     Outcome: Progressing       PHYSICAL THERAPY EVALUATION    Patient: Boo Morelos (73 y.o. male)  Date: 6/24/2024  Primary Diagnosis: SHERI (acute kidney injury) (HCC) [N17.9]  Acute respiratory failure with hypoxia (HCC) [J96.01]  Wound of sacral region, initial encounter [S31.000A]  Severe sepsis (HCC) [A41.9, R65.20]  Pneumonia of both lower lobes due to infectious organism [J18.9]  Sepsis with acute organ dysfunction without septic shock, due to unspecified organism, unspecified organ dysfunction type (HCC) [A41.9, R65.20]       Precautions: Restrictions/Precautions: Fall Risk                      ASSESSMENT : Boo Morelos is a 73 y.o. male w/ hx of Parkinsons, dementia, admitted from home w/ probable aspiration pneumonia.   DEFICITS/IMPAIRMENTS:   The patient is limited by decreased functional mobility, ROM, strength, body mechanics, activity tolerance, endurance, coordination, balance, posture, fine-motor control with above deficits who would benefit from PT to address these impairments.  Patient ambulating with PT at home, able to transfer with care aides or with wife. Patient now 
  Problem: Physical Therapy - Adult  Goal: By Discharge: Performs mobility at highest level of function for planned discharge setting.  See evaluation for individualized goals.  Description: FUNCTIONAL STATUS PRIOR TO ADMISSION: assisted by wife and caregivers; ambulating with HHPT    HOME SUPPORT PRIOR TO ADMISSION: wife, daily caregivers 2x/day    Physical Therapy Goals  Initiated 6/24/2024  1.  Patient will move from supine to sit and sit to supine in bed with moderate assistance within 7 day(s).    2.  Patient will perform sit to stand with moderate assistance within 7 day(s).  3.  Patient will transfer from bed to chair and chair to bed with moderate assistance using the least restrictive device within 7 day(s).  4.  Patient will ambulate with moderate assistance for 50 feet with the least restrictive device within 7 day(s).     Outcome: Progressing   PHYSICAL THERAPY TREATMENT    Patient: Boo Morelos (73 y.o. male)  Date: 6/25/2024  Diagnosis: SHERI (acute kidney injury) (HCC) [N17.9]  Acute respiratory failure with hypoxia ([facility]) [J96.01]  Wound of sacral region, initial encounter [S31.000A]  Severe sepsis (HCC) [A41.9, R65.20]  Pneumonia of both lower lobes due to infectious organism [J18.9]  Sepsis with acute organ dysfunction without septic shock, due to unspecified organism, unspecified organ dysfunction type (HCC) [A41.9, R65.20] Sepsis with acute organ dysfunction without septic shock (HCC)      Precautions: Fall Risk                      ASSESSMENT:  Patient continues to benefit from skilled PT services and is slowly progressing towards goals. Patient limited by rigidity, impaired balance, poor command following, L inattention, and decreased activity tolerance.  Patient overall requires maxA x 2 to come to EOB and is extremely rigid.  Able to hold self in sitting once feet on the floor and hips squared.  Worked on sit to stand but requires maxA x 2 and has poor upright balance.  Unable to fully extend 
  Problem: SLP Adult - Impaired Swallowing  Goal: By Discharge: Advance to least restrictive diet without signs or symptoms of aspiration for planned discharge setting.  See evaluation for individualized goals.  Description: Speech pathology goals initiated 6/24/2024   1. Patient will tolerate least restrictive diet free of s/s aspiration by discharge  Outcome: Progressing   Speech LAnguage Pathology EVALUATION    Patient: Boo Morelos (73 y.o. male)  Date: 6/24/2024  Primary Diagnosis: SHERI (acute kidney injury) ([facility]) [N17.9]  Acute respiratory failure with hypoxia ([facility]) [J96.01]  Wound of sacral region, initial encounter [S31.000A]  Severe sepsis ([facility]) [A41.9, R65.20]  Pneumonia of both lower lobes due to infectious organism [J18.9]  Sepsis with acute organ dysfunction without septic shock, due to unspecified organism, unspecified organ dysfunction type ([facility]) [A41.9, R65.20]       Precautions:                    Patient with brown tinged, stringy secretions covering teeth and lips on arrival. Provided thorough oral care with hydrogen peroxide oral solution priot to any PO trials. Patient with suspected at least moderate oral dysphagia and suspect functional pharyngeal swallow; however, likely to be extremely variable throughout the day and day to day.  Patient with active acceptance of tsp of water; however, held in his mouth for ~4 minutes before initiating a piecemeal swallow despite repeated tactile, verbal cues and empty spoon presentation. Decreased mouth opening for acceptance of puree and benefit from downward pressure to lower lip. Active acceptance from straw note. Patient with slow bolus manipulation of puree with delayed A-P propulsion but full oral clearance. No overt s/s aspiration with single straw sips of thin or puree. As session progressed, patient with increased drowsiness and stopped accepting.   Given time and effort required to consume scant amounts of PO, he will likely have a difficult time 
  Problem: Safety - Adult  Goal: Free from fall injury  6/27/2024 2234 by Aaron Salter RN  Outcome: Progressing  6/27/2024 1408 by Graciela Ramirez RN  Outcome: Progressing     Problem: Pain  Goal: Verbalizes/displays adequate comfort level or baseline comfort level  6/27/2024 2234 by Aaron Salter RN  Outcome: Progressing  6/27/2024 1408 by Graciela Ramirez RN  Outcome: Progressing     Problem: Skin/Tissue Integrity  Goal: Absence of new skin breakdown  Description: 1.  Monitor for areas of redness and/or skin breakdown  2.  Assess vascular access sites hourly  3.  Every 4-6 hours minimum:  Change oxygen saturation probe site  4.  Every 4-6 hours:  If on nasal continuous positive airway pressure, respiratory therapy assess nares and determine need for appliance change or resting period.  6/27/2024 2234 by Aaron Salter RN  Outcome: Progressing  6/27/2024 1408 by Graciela Ramirez RN  Outcome: Progressing     
  Problem: Safety - Adult  Goal: Free from fall injury  Outcome: Progressing  Flowsheets (Taken 6/27/2024 0125)  Free From Fall Injury: Instruct family/caregiver on patient safety     Problem: Pain  Goal: Verbalizes/displays adequate comfort level or baseline comfort level  Outcome: Progressing  Flowsheets (Taken 6/27/2024 0125)  Verbalizes/displays adequate comfort level or baseline comfort level: Encourage patient to monitor pain and request assistance     Problem: Skin/Tissue Integrity  Goal: Absence of new skin breakdown  Description: 1.  Monitor for areas of redness and/or skin breakdown  2.  Assess vascular access sites hourly  3.  Every 4-6 hours minimum:  Change oxygen saturation probe site  4.  Every 4-6 hours:  If on nasal continuous positive airway pressure, respiratory therapy assess nares and determine need for appliance change or resting period.  Outcome: Progressing     
  Problem: Safety - Adult  Goal: Free from fall injury  Outcome: Progressing  Flowsheets (Taken 6/29/2024 0205)  Free From Fall Injury: Instruct family/caregiver on patient safety     Problem: Discharge Planning  Goal: Discharge to home or other facility with appropriate resources  Flowsheets  Taken 6/29/2024 0205  Discharge to home or other facility with appropriate resources: Identify barriers to discharge with patient and caregiver  Taken 6/28/2024 2050  Discharge to home or other facility with appropriate resources: Identify barriers to discharge with patient and caregiver     Problem: Pain  Goal: Verbalizes/displays adequate comfort level or baseline comfort level  Flowsheets (Taken 6/29/2024 0205)  Verbalizes/displays adequate comfort level or baseline comfort level:   Encourage patient to monitor pain and request assistance   Assess pain using appropriate pain scale   Administer analgesics based on type and severity of pain and evaluate response   Implement non-pharmacological measures as appropriate and evaluate response   Consider cultural and social influences on pain and pain management   Notify Licensed Independent Practitioner if interventions unsuccessful or patient reports new pain     Problem: Nutrition Deficit:  Goal: Optimize nutritional status  Outcome: Not Progressing  Flowsheets (Taken 6/29/2024 0205)  Nutrient intake appropriate for improving, restoring, or maintaining nutritional needs:   Assess nutritional status and recommend course of action   Monitor oral intake, labs, and treatment plans     Problem: Nutrition Deficit:  Goal: Optimize nutritional status  Outcome: Not Progressing  Flowsheets (Taken 6/29/2024 0205)  Nutrient intake appropriate for improving, restoring, or maintaining nutritional needs:   Assess nutritional status and recommend course of action   Monitor oral intake, labs, and treatment plans     
  Problem: Safety - Adult  Goal: Free from fall injury  Outcome: Progressing  Flowsheets (Taken 6/30/2024 0151)  Free From Fall Injury: Instruct family/caregiver on patient safety     Problem: Discharge Planning  Goal: Discharge to home or other facility with appropriate resources  Flowsheets (Taken 6/30/2024 0151)  Discharge to home or other facility with appropriate resources: Identify barriers to discharge with patient and caregiver     Problem: Pain  Goal: Verbalizes/displays adequate comfort level or baseline comfort level  Outcome: Progressing  Flowsheets (Taken 6/30/2024 0151)  Verbalizes/displays adequate comfort level or baseline comfort level:   Encourage patient to monitor pain and request assistance   Assess pain using appropriate pain scale   Administer analgesics based on type and severity of pain and evaluate response   Implement non-pharmacological measures as appropriate and evaluate response   Consider cultural and social influences on pain and pain management   Notify Licensed Independent Practitioner if interventions unsuccessful or patient reports new pain     Problem: Skin/Tissue Integrity  Goal: Absence of new skin breakdown  Description: 1.  Monitor for areas of redness and/or skin breakdown  Outcome: Progressing     
Discharge IV Antibiotic Order  [facility]  [address] [address]  TEL: [phone]  FAX: [phone]      1.  Diagnosis:  Bacteremia  2.  Antibiotic:  IV Ertapenem 1 gm every 24 hours       END DATE: 7/5/2024  3.  Routine midline Care including PRN catheter flow management  4.  Weekly labs: no labs   [] CBC/diff/platelets   [] BUN/Creatinine    [] CPK. Please hold your cholesterol medication (name ends with STATIN) while you are taking daily Daptomycin.    []  AST/Total bilirubin/Alkaline Phosphatase    [] CRP   []Trough Vancomycin level goal 15-20. Drawn every Monday and Thursday. Clinical pharmacy consult for Vancomycin dosing according to the trough level.   5.  Fax lab to [phone].  Call Critical Lab Values to [phone]  6.  May send to IR for line evaluation or replacement [phone]  7.  Home Health to pull PICC line at end of therapy   8.  Allergies:  No Known Allergies          CARI Mahoney NP      
awareness, impaired cognition, high risk for falls, not safe to be alone, and concern for safely navigating or managing the home environment    IF patient discharges home will need the following DME: continuing to assess with progress       SUBJECTIVE:   Patient stated “Boo.”    OBJECTIVE DATA SUMMARY:   Cognitive/Behavioral Status:  Orientation  Overall Orientation Status: Impaired  Orientation Level:  (partially oriented to self (name only))  Cognition  Overall Cognitive Status: Exceptions  Arousal/Alertness: Delayed responses to stimuli  Following Commands: Inconsistently follows commands  Attention Span: Attends with cues to redirect  Memory: Impaired  Initiation: Requires cues for all  Sequencing: Requires cues for all    Functional Mobility and Transfers for ADLs:  Bed Mobility:  Bed Mobility Training  Bed Mobility Training: Yes  Rolling: Maximum assistance;Assist X2  Supine to Sit: Total assistance;Assist X2  Sit to Supine: Assist X2  Scooting: Assist X2;Total assistance     Transfers:   Transfer Training  Transfer Training: Yes  Sit to Stand: Maximum assistance;Assist X2  Stand to Sit: Maximum assistance;Assist X2           Balance:     Balance  Sitting: Impaired  Sitting - Static: Poor (constant support)  Sitting - Dynamic: Poor (constant support)  Standing: Impaired  Standing - Static: Poor;Constant support  Standing - Dynamic: Poor;Constant support      ADL Intervention:    Grooming: .  - Face Washing : Total Assistance and Seated EOB    Pain Rating:  None reported    Activity Tolerance:   Fair  and requires frequent rest breaks  Please refer to the flowsheet for vital signs taken during this treatment.    After treatment:   Patient left in no apparent distress in bed, Call bell within reach, Bed/ chair alarm activated, and Side rails x3    COMMUNICATION/EDUCATION:   The patient's plan of care was discussed with: physical therapist and registered nurse    Patient Education  Education Given To: 
redirect  Initiation: Requires cues for all  Sequencing: Requires cues for all    Skin: visually intact     Edema: none noted    Hearing:    WDL    Vision/Perceptual:    Vision - Basic Assessment  Prior Vision: No visual deficits  Visual History: No significant visual history  Patient Visual Report: No visual complaint reported.        Perception  Overall Perceptual Status: WFL    Range of Motion:   AROM: Generally decreased, functional         Strength:  Strength: Generally decreased, functional      Coordination:  Coordination: Generally decreased, functional            Tone & Sensation:   Tone: Abnormal  Sensation: Intact          Functional Mobility and Transfers for ADLs:    Bed Mobility:     Bed Mobility Training  Bed Mobility Training: Yes  Supine to Sit: Maximum assistance;Assist X2  Sit to Supine: Maximum assistance;Assist X2  Scooting: Maximum assistance;Assist X2    Transfers:      Transfer Training  Transfer Training: Yes  Sit to Stand: Maximum assistance;Assist X2;Adaptive equipment  Stand to Sit: Maximum assistance;Assist X2;Additional time  Bed to Chair: Maximum assistance;Assist X2;Additional time;Adaptive equipment                     Balance:      Balance  Sitting: Impaired  Sitting - Static: Poor (constant support)  Sitting - Dynamic: Poor (constant support)  Standing: Impaired  Standing - Static: Poor;Constant support  Standing - Dynamic: Poor;Constant support      ADL Assessment:          Feeding: Maximum assistance       Grooming: Maximum assistance  Grooming Skilled Clinical Factors: to wash face in supported sitting. Pt required [place] assist to bring washcloth to face despite verbal/tactile cues.    UE Bathing: Dependent/Total            LE Bathing: Dependent/Total       UE Dressing: Dependent/Total       LE Dressing: Dependent/Total  LE Dressing Skilled Clinical Factors: to [place] socks at bed level    Toileting: Dependent/Total  Toileting Skilled Clinical Factors: incontinent at baseline

## 2024-06-30 NOTE — CARE COORDINATION
CM was asked to set up pt's d/c transportation today to [facility] for 3pm. CM called [facility] and scheduled pt's transportation for 3pm today. CM called [facility] to cancel the previous transportation that had been set up for 7/1/24. WILFRID Suarez,ACM-SW

## 2024-07-01 PROBLEM — Z51.5 HOSPICE CARE: Status: ACTIVE | Noted: 2024-01-01

## 2024-07-01 PROBLEM — R45.1 RESTLESSNESS AND AGITATION: Status: ACTIVE | Noted: 2024-01-01

## 2024-07-01 PROBLEM — R52 NONVERBAL SIGNS OF PAIN: Status: ACTIVE | Noted: 2024-01-01

## 2024-07-01 PROBLEM — R06.02 SHORTNESS OF BREATH: Status: ACTIVE | Noted: 2024-01-01

## 2024-07-06 NOTE — DISCHARGE SUMMARY
Discharge Summary       PATIENT ID: Boo Morelos  MRN: [id number]   YOB: 1950    DATE OF ADMISSION: 6/23/2024 11:52 AM    DATE OF DISCHARGE: 06/30/2024   PRIMARY CARE PROVIDER: Luis Pettit DO     ATTENDING PHYSICIAN: Song Monge MD    DISCHARGING PROVIDER: Song Monge MD    To contact this individual call [phone] and ask the  to page.  If unavailable ask to be transferred the Adult Hospitalist Department.    CONSULTATIONS: IP CONSULT TO HOSPITALIST  IP WOUND CARE NURSE CONSULT TO EVAL  IP CONSULT TO INFECTIOUS DISEASES  IP CONSULT TO GENERAL SURGERY  IP CONSULT TO DIETITIAN  IP CONSULT TO PALLIATIVE CARE    PROCEDURES/SURGERIES: * No surgery found *     ADMITTING DIAGNOSES & HOSPITAL COURSE:   Admission Summary:   Boo Morelos is a 73 y.o. male with a pmhx parkinsons, and dementia presented from [facility] with transient hypoxia concerning for aspiration and was found to have severe sepsis due to aspiration pneumonia and infected sacral wound.     Upon initial evaluation in the emergency room, he was febrile temp 101.3, tachycardic to 121, hypoxic to 88% on room air with improvement to 97% on 4 L nasal cannula.  Labs showed WBC 12.9, sodium 147, glucose 213, BUN 47, creatinine 1.63 (b/l 0.99), and lactic 6.8.  Chest x-ray showed left lower lobe moderate pneumonia, and right lower lobe mild pneumonia concerning for aspiration, and emphysema.    He received ceftriaxone, and sepsis fluids and was admitted for further inpatient evaluation and management  He's response to treatment in progress over almost a week of inpatient management was poor with expected poor prognosis.  After having extensive discussion with his family by the primary attending, palliative and hospice team, family decided to results to hospice and patient was discharged to the hospice house.      DISCHARGE DIAGNOSES / PLAN:    Severe sepsis with septic shock due to aspiration pneumonia complicated by E.

## (undated) DEVICE — SUTURE VICRYL SZ 2-0 L36IN ABSRB VLT L36MM CT-1 1/2 CIR J345H

## (undated) DEVICE — BIT DRL L145MM DIA4.2MM NONSTERILE 3 FLUT NDL PNT QUIK CPL

## (undated) DEVICE — ELECTRODE PT RET AD L9FT HI MOIST COND ADH HYDRGEL CORDED

## (undated) DEVICE — BASIN ST MAJOR-NO CAUTERY: Brand: MEDLINE INDUSTRIES, INC.

## (undated) DEVICE — GLOVE ORTHO 8   MSG9480

## (undated) DEVICE — PACK,BASIC,SIRUS,V: Brand: MEDLINE

## (undated) DEVICE — 4-PORT MANIFOLD: Brand: NEPTUNE 2

## (undated) DEVICE — SUTURE MONOCRYL SZ 3-0 L27IN ABSRB UD L19MM PS-2 3/8 CIR PRIM Y427H

## (undated) DEVICE — SOLUTION IRRIG 1000ML 0.9% SOD CHL USP POUR PLAS BTL

## (undated) DEVICE — 6619 2 PTNT ISO SYS INCISE AREA&LT;(&GT;&&LT;)&GT;P: Brand: STERI-DRAPE™ IOBAN™ 2

## (undated) DEVICE — SUTURE VICRYL SZ 0 L27IN ABSRB UD L36MM CP-1 1/2 CIR REV CUT J267H

## (undated) DEVICE — GLOVE SURG SZ 8 L12IN FNGR THK94MIL STD WHT LTX FREE

## (undated) DEVICE — SOLUTION SURG PREP 26 CC PURPREP

## (undated) DEVICE — STAPLER SKIN H3.9MM WIRE DIA0.58MM CRWN 6.9MM 35 STPL ROT

## (undated) DEVICE — C-ARM: Brand: UNBRANDED

## (undated) DEVICE — HYPODERMIC SAFETY NEEDLE: Brand: MAGELLAN

## (undated) DEVICE — X-RAY DETECTABLE SPONGES,16 PLY: Brand: VISTEC

## (undated) DEVICE — GUIDEWIRE ORTH L400MM DIA3.2MM FOR TFN

## (undated) DEVICE — BANDAGE ADH W2XL4IN NITRL FAB STRP CURAD

## (undated) DEVICE — ROD RMR L950MM DIA2.5MM W/ EXTN BALL TIP

## (undated) DEVICE — LIQUIBAND RAPID ADHESIVE 36/CS 0.8ML: Brand: MEDLINE

## (undated) DEVICE — BIT DRL L500MM DIA6X9MM CANN STP L QUIK CPL FOR DH DC TFN

## (undated) DEVICE — 3M™ TEGADERM™ TRANSPARENT FILM DRESSING FRAME STYLE, 1626W, 4 IN X 4-3/4 IN (10 CM X 12 CM), 50/CT 4CT/CASE: Brand: 3M™ TEGADERM™

## (undated) DEVICE — 3M™ STERI-DRAPE™ U-DRAPE 1015: Brand: STERI-DRAPE™

## (undated) DEVICE — PENCIL SMK EVAC 10 FT BLADE ELECTRD ROCKER FOR TELSCP